# Patient Record
Sex: MALE | Race: WHITE | NOT HISPANIC OR LATINO | Employment: OTHER | URBAN - METROPOLITAN AREA
[De-identification: names, ages, dates, MRNs, and addresses within clinical notes are randomized per-mention and may not be internally consistent; named-entity substitution may affect disease eponyms.]

---

## 2022-03-04 PROBLEM — E78.5 HYPERLIPIDEMIA: Status: ACTIVE | Noted: 2020-07-02

## 2022-03-04 PROBLEM — E03.9 HYPOTHYROIDISM: Status: ACTIVE | Noted: 2020-07-02

## 2022-03-04 PROBLEM — I10 HYPERTENSION: Status: ACTIVE | Noted: 2021-05-18

## 2022-03-15 ENCOUNTER — TELEPHONE (OUTPATIENT)
Dept: ORTHOPEDICS | Facility: CLINIC | Age: 62
End: 2022-03-15
Payer: COMMERCIAL

## 2022-03-15 ENCOUNTER — TELEPHONE (OUTPATIENT)
Dept: ADMINISTRATIVE | Facility: OTHER | Age: 62
End: 2022-03-15
Payer: COMMERCIAL

## 2022-03-15 NOTE — TELEPHONE ENCOUNTER
----- Message from Adrian Schultz Jr., MD sent at 3/14/2022  3:48 PM CDT -----  Regarding: RE: ED follow up  Contact: [phone]  Next 1-2 weeks  ----- Message -----  From: Edelmira Holden MA  Sent: 3/14/2022   3:21 PM CDT  To: Adrian Schultz Jr., MD  Subject: FW: ED follow up                                 How soon would you like patient to be seen?  ----- Message -----  From: Idalia Sheikh  Sent: 3/14/2022   2:57 PM CDT  To: [facility] Staff  Subject: ED follow up                                     Patient is requesting a call back regarding scheduling an ED follow up with referral for   Closed nondisplaced fracture of shaft of fourth metacarpal bone of left hand.   Would the patient rather a call back or a response via [facility]?  call  Best Call Back Number:  [phone]  Additional Information:

## 2022-03-16 ENCOUNTER — OFFICE VISIT (OUTPATIENT)
Dept: ORTHOPEDICS | Facility: CLINIC | Age: 62
End: 2022-03-16
Payer: COMMERCIAL

## 2022-03-16 VITALS — BODY MASS INDEX: 28.79 KG/M2 | WEIGHT: 190 LBS | HEIGHT: 68 IN

## 2022-03-16 DIAGNOSIS — S62.355A CLOSED NONDISPLACED FRACTURE OF SHAFT OF FOURTH METACARPAL BONE OF LEFT HAND, INITIAL ENCOUNTER: ICD-10-CM

## 2022-03-16 PROCEDURE — 99999 PR PBB SHADOW E&M-EST. PATIENT-LVL III: ICD-10-PCS | Mod: PBBFAC,,, | Performed by: ORTHOPAEDIC SURGERY

## 2022-03-16 PROCEDURE — 99203 PR OFFICE/OUTPT VISIT, NEW, LEVL III, 30-44 MIN: ICD-10-PCS | Mod: S$GLB,,, | Performed by: ORTHOPAEDIC SURGERY

## 2022-03-16 PROCEDURE — 99203 OFFICE O/P NEW LOW 30 MIN: CPT | Mod: S$GLB,,, | Performed by: ORTHOPAEDIC SURGERY

## 2022-03-16 PROCEDURE — 99999 PR PBB SHADOW E&M-EST. PATIENT-LVL III: CPT | Mod: PBBFAC,,, | Performed by: ORTHOPAEDIC SURGERY

## 2022-03-16 RX ORDER — HYDROCODONE BITARTRATE AND ACETAMINOPHEN 5; 325 MG/1; MG/1
1 TABLET ORAL EVERY 4 HOURS PRN
Qty: 30 TABLET | Refills: 0 | Status: SHIPPED | OUTPATIENT
Start: 2022-03-16 | End: 2022-03-26

## 2022-03-16 NOTE — PROGRESS NOTES
"Subjective:      Patient ID: Rashaad Amos is a 61 y.o. male.    Chief Complaint: Consult and Hand Injury (Left hand fracture.)      HPI  Rashaad Amos is a  61 y.o. male presenting today for left hand injury.  There was a history of trauma.  Onset of symptoms began 4 days ago after a fall he sustained landing on his left hand  Seen at urgent care noted to have a 4th metacarpal fracture placed in a splint referred for treatment today  Complaining of pain left hand.      Review of patient's allergies indicates:  No Known Allergies      Current Outpatient Medications   Medication Sig Dispense Refill    AJOVY AUTOINJECTOR 225 mg/1.5 mL autoinjector Inject 225 mg into the skin every 30 days.      atorvastatin (LIPITOR) 10 MG tablet Take 10 mg by mouth once daily.      HYDROcodone-acetaminophen (NORCO) 7.5-325 mg per tablet Take 1 tablet by mouth every 4 (four) hours as needed for Pain. 18 tablet 0    ibuprofen (ADVIL,MOTRIN) 800 MG tablet Take 1 tablet (800 mg total) by mouth every 6 (six) hours as needed for Pain. 30 tablet 0    levothyroxine (SYNTHROID) 50 MCG tablet Take 50 mcg by mouth once daily.      losartan (COZAAR) 100 MG tablet Take 50 mg by mouth 2 (two) times a day.      testosterone (ANDROGEL) 20.25 mg/1.25 gram (1.62 %) GlPm 20.25 mg 4 (four) times daily.      UBROGEPANT 50 mg tablet Take 50 mg by mouth as needed.       No current facility-administered medications for this visit.       Past Medical History:   Diagnosis Date    Hypertension        No past surgical history on file.    Review of Systems:  ROS    OBJECTIVE:     PHYSICAL EXAM:  Height: 5' 8" (172.7 cm) Weight: 86.2 kg (190 lb)  Vitals:    03/16/22 1353   Weight: 86.2 kg (190 lb)   Height: 5' 8" (1.727 m)   PainSc: 10-Worst pain ever   PainLoc: Hand     Well developed, well nourished male in no acute distress  Alert and oriented x 3  HEENT- Normal exam  Lungs- Clear to auscultation  Heart- Regular rate and rhythm  Abdomen- " Soft nontender  Extremity exam- examination left hand there is some mild swelling slight bruising diffuse tenderness over the 4th metacarpal  Range of motion fingers limited  Sensation intact  No rotational overlap of the digits    RADIOGRAPHS:  AP lateral x-ray left hand demonstrates a minimally displaced 4th metacarpal shaft fracture acceptable position  Comments: I have personally reviewed the imaging and I agree with the above radiologist's report.    ASSESSMENT/PLAN:     IMPRESSION:  Minimally displaced 4th metacarpal shaft fracture left hand    PLAN:  Explained the nature of the injury to the patient  Fitted him for an ulnar gutter splint part-time use  He can have it off for watching TV and bathing  No heavy lifting  Ultram for pain  Follow-up 3 weeks       - We talked at length about the anatomy and pathophysiology of   Encounter Diagnosis   Name Primary?    Closed nondisplaced fracture of shaft of fourth metacarpal bone of left hand, initial encounter            Disclaimer: This note has been generated using voice-recognition software. There may be typographical errors that have been missed during proof-reading.

## 2022-03-22 RX ORDER — HYDROCODONE BITARTRATE AND ACETAMINOPHEN 5; 325 MG/1; MG/1
1 TABLET ORAL EVERY 6 HOURS PRN
Qty: 30 TABLET | Refills: 0 | Status: SHIPPED | OUTPATIENT
Start: 2022-03-22 | End: 2022-04-01

## 2022-03-29 ENCOUNTER — TELEPHONE (OUTPATIENT)
Dept: ORTHOPEDICS | Facility: CLINIC | Age: 62
End: 2022-03-29
Payer: COMMERCIAL

## 2022-03-29 RX ORDER — HYDROCODONE BITARTRATE AND ACETAMINOPHEN 5; 325 MG/1; MG/1
1 TABLET ORAL EVERY 12 HOURS PRN
Qty: 30 TABLET | Refills: 0 | Status: SHIPPED | OUTPATIENT
Start: 2022-03-29 | End: 2022-04-08

## 2022-03-29 NOTE — TELEPHONE ENCOUNTER
----- Message from Solange Mendez sent at 3/29/2022  9:00 AM CDT -----  Regarding: Refills  Contact: [phone]  Type:  RX Refill Request    Who Called: PT   Refill or New Rx: Refills   RX Name and Strength: HYDROcodone-acetaminophen (NORCO) 5-325 mg per tablet 30 tablet   How is the patient currently taking it? (ex. 1XDay): Take 1 tablet by mouth every 6 (six) hours as needed for Pain  Is this a 30 day or 90 day RX: 30  Preferred Pharmacy with phone number: [id number] - [place] - [address]   Phone:  [phone]  Fax:  [phone]

## 2022-04-01 DIAGNOSIS — S62.355A CLOSED NONDISPLACED FRACTURE OF SHAFT OF FOURTH METACARPAL BONE OF LEFT HAND, INITIAL ENCOUNTER: Primary | ICD-10-CM

## 2022-04-06 ENCOUNTER — OFFICE VISIT (OUTPATIENT)
Dept: ORTHOPEDICS | Facility: CLINIC | Age: 62
End: 2022-04-06
Payer: COMMERCIAL

## 2022-04-06 VITALS — WEIGHT: 190.06 LBS | BODY MASS INDEX: 28.8 KG/M2 | HEIGHT: 68 IN

## 2022-04-06 DIAGNOSIS — S62.355D CLOSED NONDISPLACED FRACTURE OF SHAFT OF FOURTH METACARPAL BONE OF LEFT HAND WITH ROUTINE HEALING, SUBSEQUENT ENCOUNTER: Primary | ICD-10-CM

## 2022-04-06 PROCEDURE — 99213 OFFICE O/P EST LOW 20 MIN: CPT | Mod: S$GLB,,, | Performed by: ORTHOPAEDIC SURGERY

## 2022-04-06 PROCEDURE — 99999 PR PBB SHADOW E&M-EST. PATIENT-LVL III: ICD-10-PCS | Mod: PBBFAC,,, | Performed by: ORTHOPAEDIC SURGERY

## 2022-04-06 PROCEDURE — 99213 PR OFFICE/OUTPT VISIT, EST, LEVL III, 20-29 MIN: ICD-10-PCS | Mod: S$GLB,,, | Performed by: ORTHOPAEDIC SURGERY

## 2022-04-06 PROCEDURE — 99999 PR PBB SHADOW E&M-EST. PATIENT-LVL III: CPT | Mod: PBBFAC,,, | Performed by: ORTHOPAEDIC SURGERY

## 2022-04-06 RX ORDER — IBUPROFEN 800 MG/1
800 TABLET ORAL 2 TIMES DAILY WITH MEALS
Qty: 40 TABLET | Refills: 1 | Status: SHIPPED | OUTPATIENT
Start: 2022-04-06 | End: 2022-05-06

## 2022-04-06 NOTE — PROGRESS NOTES
"Subjective:      Patient ID: Rashaad Amos is a 61 y.o. male.  Chief Complaint: Follow-up (left hand fx)      HPI  Rashaad Amos is a  61 y.o. male presenting today for follow up of left 4th metacarpal shaft fracture now 3 weeks out from injury.  He reports that he is doing well having some pain and stiffness but improving  .    Review of patient's allergies indicates:  No Known Allergies      Current Outpatient Medications   Medication Sig Dispense Refill    AJOVY AUTOINJECTOR 225 mg/1.5 mL autoinjector Inject 225 mg into the skin every 30 days.      atorvastatin (LIPITOR) 10 MG tablet Take 10 mg by mouth once daily.      HYDROcodone-acetaminophen (NORCO) 5-325 mg per tablet Take 1 tablet by mouth every 12 (twelve) hours as needed for Pain. 30 tablet 0    levothyroxine (SYNTHROID) 50 MCG tablet Take 50 mcg by mouth once daily.      losartan (COZAAR) 100 MG tablet Take 50 mg by mouth 2 (two) times a day.      HYDROcodone-acetaminophen (NORCO) 7.5-325 mg per tablet Take 1 tablet by mouth every 4 (four) hours as needed for Pain. (Patient not taking: Reported on 4/6/2022) 18 tablet 0    ibuprofen (ADVIL,MOTRIN) 800 MG tablet Take 1 tablet (800 mg total) by mouth every 6 (six) hours as needed for Pain. (Patient not taking: Reported on 4/6/2022) 30 tablet 0    ibuprofen (ADVIL,MOTRIN) 800 MG tablet Take 1 tablet (800 mg total) by mouth 2 (two) times daily with meals. 40 tablet 1    testosterone (ANDROGEL) 20.25 mg/1.25 gram (1.62 %) GlPm 20.25 mg 4 (four) times daily.      UBROGEPANT 50 mg tablet Take 50 mg by mouth as needed.       No current facility-administered medications for this visit.       Past Medical History:   Diagnosis Date    Hypertension        No past surgical history on file.    OBJECTIVE:   PHYSICAL EXAM:  Height: 5' 8" (172.7 cm) Weight: 86.2 kg (190 lb 0.6 oz)  Vitals:    04/06/22 1259   Weight: 86.2 kg (190 lb 0.6 oz)   Height: 5' 8" (1.727 m)   PainSc:   5   PainLoc: Hand "     Ortho/SPM Exam  Examination left hand fracture site still slightly tender swelling minimal range of motion slightly decreased  Alignment looks good clinically no overlap sensation intact      RADIOGRAPHS:  AP lateral x-ray left hand demonstrates healing fracture 4th metacarpal minimally displaced  Comments: I have personally reviewed the imaging and I agree with the above radiologist's report.    ASSESSMENT/PLAN:     IMPRESSION:  4th metacarpal shaft fracture left hand    PLAN:  He can start weaning out of the splint now specially at home  I recommended some warm water soaks gentle range of motion Motrin as needed for pain  No heavy lifting      FOLLOW UP:  3 weeks    Disclaimer: This note has been generated using voice-recognition software. There may be typographical errors that have been missed during proof-reading.

## 2022-05-03 ENCOUNTER — PATIENT MESSAGE (OUTPATIENT)
Dept: ORTHOPEDICS | Facility: CLINIC | Age: 62
End: 2022-05-03
Payer: COMMERCIAL

## 2022-05-04 ENCOUNTER — OFFICE VISIT (OUTPATIENT)
Dept: ORTHOPEDICS | Facility: CLINIC | Age: 62
End: 2022-05-04
Payer: COMMERCIAL

## 2022-05-04 ENCOUNTER — TELEPHONE (OUTPATIENT)
Dept: ORTHOPEDICS | Facility: CLINIC | Age: 62
End: 2022-05-04
Payer: COMMERCIAL

## 2022-05-04 VITALS — WEIGHT: 190 LBS | HEIGHT: 68 IN | BODY MASS INDEX: 28.79 KG/M2

## 2022-05-04 DIAGNOSIS — S62.355A CLOSED NONDISPLACED FRACTURE OF SHAFT OF FOURTH METACARPAL BONE OF LEFT HAND, INITIAL ENCOUNTER: Primary | ICD-10-CM

## 2022-05-04 DIAGNOSIS — S62.355D CLOSED NONDISPLACED FRACTURE OF SHAFT OF FOURTH METACARPAL BONE OF LEFT HAND WITH ROUTINE HEALING, SUBSEQUENT ENCOUNTER: Primary | ICD-10-CM

## 2022-05-04 PROCEDURE — 99999 PR PBB SHADOW E&M-EST. PATIENT-LVL III: ICD-10-PCS | Mod: PBBFAC,,, | Performed by: ORTHOPAEDIC SURGERY

## 2022-05-04 PROCEDURE — 99999 PR PBB SHADOW E&M-EST. PATIENT-LVL III: CPT | Mod: PBBFAC,,, | Performed by: ORTHOPAEDIC SURGERY

## 2022-05-04 PROCEDURE — 99213 PR OFFICE/OUTPT VISIT, EST, LEVL III, 20-29 MIN: ICD-10-PCS | Mod: S$GLB,,, | Performed by: ORTHOPAEDIC SURGERY

## 2022-05-04 PROCEDURE — 99213 OFFICE O/P EST LOW 20 MIN: CPT | Mod: S$GLB,,, | Performed by: ORTHOPAEDIC SURGERY

## 2022-05-04 RX ORDER — HYDROCHLOROTHIAZIDE 12.5 MG/1
12.5 CAPSULE ORAL DAILY
COMMUNITY
Start: 2022-05-03 | End: 2022-05-16

## 2022-05-04 RX ORDER — OMEGA-3 FATTY ACIDS 1000 MG
2 CAPSULE ORAL
COMMUNITY

## 2022-05-04 NOTE — PROGRESS NOTES
"Subjective:      Patient ID: Rashaad Amos is a 61 y.o. male.  Chief Complaint: Fracture and Follow-up of the Left Hand      HPI  Rashaad Amos is a  61 y.o. male presenting today for follow up of 4th metacarpal shaft fracture.  He reports that he is now about 5 and half weeks out from injury  Pain is minimal but he is developing some stiffness in the fingers of the left hand difficulty with gripping  He does have the brace which he has used.    Review of patient's allergies indicates:  No Known Allergies      Current Outpatient Medications   Medication Sig Dispense Refill    AJOVY AUTOINJECTOR 225 mg/1.5 mL autoinjector Inject 225 mg into the skin every 30 days.      atorvastatin (LIPITOR) 10 MG tablet Take 10 mg by mouth once daily.      hydroCHLOROthiazide (MICROZIDE) 12.5 mg capsule Take 12.5 mg by mouth once daily.      ibuprofen (ADVIL,MOTRIN) 800 MG tablet Take 1 tablet (800 mg total) by mouth 2 (two) times daily with meals. 40 tablet 1    levothyroxine (SYNTHROID) 50 MCG tablet Take 50 mcg by mouth once daily.      losartan (COZAAR) 100 MG tablet Take 50 mg by mouth 2 (two) times a day.      omega-3 fatty acids 1,000 mg Cap Take 2 g by mouth.      testosterone (ANDROGEL) 20.25 mg/1.25 gram (1.62 %) GlPm 20.25 mg 4 (four) times daily.       No current facility-administered medications for this visit.       Past Medical History:   Diagnosis Date    Hypertension        No past surgical history on file.    OBJECTIVE:   PHYSICAL EXAM:  Height: 5' 8" (172.7 cm) Weight: 86.2 kg (190 lb)  Vitals:    05/04/22 1305   Weight: 86.2 kg (190 lb)   Height: 5' 8" (1.727 m)   PainSc:   3     Ortho/SPM Exam  Examination left hand there is some mild swelling and definitely stiffness of the fingers he has a difficult time making a fist and  strength is decreased  There is limited flexion of the ring and small finger  Sensation intact  Fracture site not really tender    RADIOGRAPHS:  AP lateral x-ray left " hand demonstrates healing fracture at the base of the 4th metacarpal some callus is noted  Comments: I have personally reviewed the imaging and I agree with the above radiologist's report.    ASSESSMENT/PLAN:     IMPRESSION:  Left 4th metacarpal shaft fracture    PLAN:  I am little bit concerned about the stiffness in the fingers I recommended some OT will start range of motion and progress to gentle strengthening  I have also recommended that he discontinue the splint at home work on some warm water soaks and range of motion  No heavy lifting yet    FOLLOW UP:  3-4 weeks    Disclaimer: This note has been generated using voice-recognition software. There may be typographical errors that have been missed during proof-reading.

## 2022-05-09 PROBLEM — Z78.9 IMPAIRED INSTRUMENTAL ACTIVITIES OF DAILY LIVING (IADL): Status: ACTIVE | Noted: 2022-05-09

## 2022-05-09 PROBLEM — M79.642 LEFT HAND PAIN: Status: ACTIVE | Noted: 2022-05-09

## 2022-05-09 PROBLEM — M25.642 STIFFNESS OF LEFT HAND JOINT: Status: ACTIVE | Noted: 2022-05-09

## 2022-05-09 PROBLEM — R53.1 WEAKNESS: Status: ACTIVE | Noted: 2022-05-09

## 2022-05-31 ENCOUNTER — TELEPHONE (OUTPATIENT)
Dept: ORTHOPEDICS | Facility: CLINIC | Age: 62
End: 2022-05-31
Payer: COMMERCIAL

## 2022-05-31 DIAGNOSIS — S62.355A CLOSED NONDISPLACED FRACTURE OF SHAFT OF FOURTH METACARPAL BONE OF LEFT HAND, INITIAL ENCOUNTER: Primary | ICD-10-CM

## 2022-06-01 ENCOUNTER — OFFICE VISIT (OUTPATIENT)
Dept: ORTHOPEDICS | Facility: CLINIC | Age: 62
End: 2022-06-01
Payer: COMMERCIAL

## 2022-06-01 VITALS — BODY MASS INDEX: 29.57 KG/M2 | WEIGHT: 195.13 LBS | HEIGHT: 68 IN

## 2022-06-01 DIAGNOSIS — S62.355D CLOSED NONDISPLACED FRACTURE OF SHAFT OF FOURTH METACARPAL BONE OF LEFT HAND WITH ROUTINE HEALING, SUBSEQUENT ENCOUNTER: Primary | ICD-10-CM

## 2022-06-01 PROCEDURE — 99213 PR OFFICE/OUTPT VISIT, EST, LEVL III, 20-29 MIN: ICD-10-PCS | Mod: S$GLB,,, | Performed by: ORTHOPAEDIC SURGERY

## 2022-06-01 PROCEDURE — 99213 OFFICE O/P EST LOW 20 MIN: CPT | Mod: S$GLB,,, | Performed by: ORTHOPAEDIC SURGERY

## 2022-06-01 PROCEDURE — 99999 PR PBB SHADOW E&M-EST. PATIENT-LVL III: ICD-10-PCS | Mod: PBBFAC,,, | Performed by: ORTHOPAEDIC SURGERY

## 2022-06-01 PROCEDURE — 99999 PR PBB SHADOW E&M-EST. PATIENT-LVL III: CPT | Mod: PBBFAC,,, | Performed by: ORTHOPAEDIC SURGERY

## 2022-06-01 NOTE — PROGRESS NOTES
"Subjective:      Patient ID: Rashaad Amos is a 62 y.o. male.  Chief Complaint: Follow-up (3 wk f/up- left hand fx )      HPI  Rashaad Amos is a  62 y.o. male presenting today for follow up of left 4th metacarpal shaft fracture.  He reports that he is still having some residual stiffness no real pain reported  He is currently in therapy which is helping.    Review of patient's allergies indicates:  No Known Allergies      Current Outpatient Medications   Medication Sig Dispense Refill    atorvastatin (LIPITOR) 10 MG tablet Take 10 mg by mouth once daily.      hydroCHLOROthiazide (HYDRODIURIL) 25 MG tablet Take 1 tablet (25 mg total) by mouth once daily. 30 tablet 11    levothyroxine (SYNTHROID) 50 MCG tablet Take 50 mcg by mouth once daily.      losartan (COZAAR) 100 MG tablet Take 50 mg by mouth 2 (two) times a day.      omega-3 fatty acids 1,000 mg Cap Take 2 g by mouth.      testosterone (ANDROGEL) 20.25 mg/1.25 gram (1.62 %) GlPm 20.25 mg 4 (four) times daily.      AJOVY AUTOINJECTOR 225 mg/1.5 mL autoinjector Inject 225 mg into the skin every 30 days.       No current facility-administered medications for this visit.       Past Medical History:   Diagnosis Date    Hypertension        No past surgical history on file.    OBJECTIVE:   PHYSICAL EXAM:  Height: 5' 8" (172.7 cm) Weight: 88.5 kg (195 lb 1.7 oz)  Vitals:    06/01/22 1327   Weight: 88.5 kg (195 lb 1.7 oz)   Height: 5' 8" (1.727 m)   PainSc: 0-No pain     Ortho/SPM Exam  Examination left hand still has little bit of mild swelling in the hand range of motion fingers improved but  strength still decreased  No tenderness at the fracture site  Sensation intact all digits    RADIOGRAPHS:  AP lateral x-ray left hand demonstrate almost completely healed fracture 4th metacarpal shaft well-aligned  Comments: I have personally reviewed the imaging and I agree with the above radiologist's report.    ASSESSMENT/PLAN:     IMPRESSION:  Status " post left 4th metacarpal shaft fracture with stiffness    PLAN:  I have given him a squeeze ball to work on range of motion strengthening  Continue therapy for few more weeks advance activities as tolerated    FOLLOW UP:  3-4 weeks    Disclaimer: This note has been generated using voice-recognition software. There may be typographical errors that have been missed during proof-reading.

## 2022-06-29 ENCOUNTER — TELEPHONE (OUTPATIENT)
Dept: ORTHOPEDICS | Facility: CLINIC | Age: 62
End: 2022-06-29
Payer: COMMERCIAL

## 2022-06-29 DIAGNOSIS — S62.355A CLOSED NONDISPLACED FRACTURE OF SHAFT OF FOURTH METACARPAL BONE OF LEFT HAND, INITIAL ENCOUNTER: Primary | ICD-10-CM

## 2022-07-06 ENCOUNTER — OFFICE VISIT (OUTPATIENT)
Dept: ORTHOPEDICS | Facility: CLINIC | Age: 62
End: 2022-07-06
Payer: COMMERCIAL

## 2022-07-06 VITALS — HEIGHT: 68 IN | WEIGHT: 195 LBS | BODY MASS INDEX: 29.55 KG/M2

## 2022-07-06 DIAGNOSIS — S62.355D CLOSED NONDISPLACED FRACTURE OF SHAFT OF FOURTH METACARPAL BONE OF LEFT HAND WITH ROUTINE HEALING, SUBSEQUENT ENCOUNTER: Primary | ICD-10-CM

## 2022-07-06 PROCEDURE — 99213 PR OFFICE/OUTPT VISIT, EST, LEVL III, 20-29 MIN: ICD-10-PCS | Mod: S$GLB,,, | Performed by: ORTHOPAEDIC SURGERY

## 2022-07-06 PROCEDURE — 99999 PR PBB SHADOW E&M-EST. PATIENT-LVL III: CPT | Mod: PBBFAC,,, | Performed by: ORTHOPAEDIC SURGERY

## 2022-07-06 PROCEDURE — 99213 OFFICE O/P EST LOW 20 MIN: CPT | Mod: S$GLB,,, | Performed by: ORTHOPAEDIC SURGERY

## 2022-07-06 PROCEDURE — 99999 PR PBB SHADOW E&M-EST. PATIENT-LVL III: ICD-10-PCS | Mod: PBBFAC,,, | Performed by: ORTHOPAEDIC SURGERY

## 2022-07-06 RX ORDER — SPIRONOLACTONE AND HYDROCHLOROTHIAZIDE 25; 25 MG/1; MG/1
1 TABLET ORAL
COMMUNITY
Start: 2022-06-14

## 2022-07-06 NOTE — PROGRESS NOTES
"Subjective:      Patient ID: Rashaad Amos is a 62 y.o. male.  Chief Complaint: Follow-up (Left hand fracture..c/o stiffness and shooting pain in fingers)      HPI  Rashaad Amos is a  62 y.o. male presenting today for follow up of left hand fracture.  He reports that he is doing well he has completed physical therapy no pain in the hand reported just some stiffness.    Review of patient's allergies indicates:  No Known Allergies      Current Outpatient Medications   Medication Sig Dispense Refill    AJOVY AUTOINJECTOR 225 mg/1.5 mL autoinjector Inject 225 mg into the skin every 30 days.      ASCORBIC ACID, VITAMIN C, ORAL Take by mouth.      atorvastatin (LIPITOR) 10 MG tablet Take 10 mg by mouth once daily.      levothyroxine (SYNTHROID) 50 MCG tablet Take 50 mcg by mouth once daily.      losartan (COZAAR) 100 MG tablet Take 50 mg by mouth 2 (two) times a day.      omega-3 fatty acids 1,000 mg Cap Take 2 g by mouth.      spironolactone-hydrochlorothiazide 25-25mg (ALDACTAZIDE) 25-25 mg Tab Take 1 tablet by mouth.      testosterone (ANDROGEL) 20.25 mg/1.25 gram (1.62 %) GlPm 20.25 mg 4 (four) times daily.       No current facility-administered medications for this visit.       Past Medical History:   Diagnosis Date    Hypertension        No past surgical history on file.    OBJECTIVE:   PHYSICAL EXAM:  Height: 5' 8" (172.7 cm) Weight: 88.5 kg (195 lb)  Vitals:    07/06/22 1438   Weight: 88.5 kg (195 lb)   Height: 5' 8" (1.727 m)   PainSc: 0-No pain     Ortho/SPM Exam  Examination left hand maybe some slight swelling  No tenderness at the fracture site over the 4th metacarpal  Range of motion fingers is slightly decreased mainly the MP joints of the index and middle finger where he has flexion to about 90°  strength is slightly decreased Tinel sign negative    RADIOGRAPHS:  AP lateral x-ray left hand demonstrate healed fracture 4th metacarpal shaft  Comments: I have personally reviewed the " imaging and I agree with the above radiologist's report.    ASSESSMENT/PLAN:     IMPRESSION:  Stiffness left hand after healed fracture    PLAN:  I recommended that he continue to work on range of motion and strengthening of the left hand  Advance to full activities  Also recommended Voltaren gel topical      FOLLOW UP:  6-8 weeks    Disclaimer: This note has been generated using voice-recognition software. There may be typographical errors that have been missed during proof-reading.

## 2022-08-30 ENCOUNTER — PATIENT MESSAGE (OUTPATIENT)
Dept: ORTHOPEDICS | Facility: CLINIC | Age: 62
End: 2022-08-30
Payer: COMMERCIAL

## 2022-08-31 ENCOUNTER — OFFICE VISIT (OUTPATIENT)
Dept: ORTHOPEDICS | Facility: CLINIC | Age: 62
End: 2022-08-31
Payer: COMMERCIAL

## 2022-08-31 VITALS — WEIGHT: 195 LBS | BODY MASS INDEX: 29.55 KG/M2 | HEIGHT: 68 IN

## 2022-08-31 DIAGNOSIS — S62.355D CLOSED NONDISPLACED FRACTURE OF SHAFT OF FOURTH METACARPAL BONE OF LEFT HAND WITH ROUTINE HEALING, SUBSEQUENT ENCOUNTER: Primary | ICD-10-CM

## 2022-08-31 DIAGNOSIS — M25.642 STIFFNESS OF LEFT HAND JOINT: ICD-10-CM

## 2022-08-31 PROBLEM — S62.355A CLOSED NONDISPLACED FRACTURE OF SHAFT OF FOURTH METACARPAL BONE OF LEFT HAND: Status: RESOLVED | Noted: 2022-03-16 | Resolved: 2022-08-31

## 2022-08-31 PROCEDURE — 99999 PR PBB SHADOW E&M-EST. PATIENT-LVL III: ICD-10-PCS | Mod: PBBFAC,,, | Performed by: ORTHOPAEDIC SURGERY

## 2022-08-31 PROCEDURE — 99213 PR OFFICE/OUTPT VISIT, EST, LEVL III, 20-29 MIN: ICD-10-PCS | Mod: S$GLB,,, | Performed by: ORTHOPAEDIC SURGERY

## 2022-08-31 PROCEDURE — 99213 OFFICE O/P EST LOW 20 MIN: CPT | Mod: S$GLB,,, | Performed by: ORTHOPAEDIC SURGERY

## 2022-08-31 PROCEDURE — 99999 PR PBB SHADOW E&M-EST. PATIENT-LVL III: CPT | Mod: PBBFAC,,, | Performed by: ORTHOPAEDIC SURGERY

## 2022-08-31 NOTE — PROGRESS NOTES
"Subjective:      Patient ID: Rashaad Amos is a 62 y.o. male.  Chief Complaint: Follow-up (Left hand fracture, slight stiffness and pain)      HPI  Rashaad Amos is a  62 y.o. male presenting today for follow up of stiffness left hand after previous fracture of the 4th metacarpal.  He reports that he is improving slowly but still having some stiffness mainly in the fingers of the left hand  He does do exercises warm water soaks and has a squeeze ball   The Voltaren is helping.    Review of patient's allergies indicates:  No Known Allergies      Current Outpatient Medications   Medication Sig Dispense Refill    AJOVY AUTOINJECTOR 225 mg/1.5 mL autoinjector Inject 225 mg into the skin every 30 days.      ASCORBIC ACID, VITAMIN C, ORAL Take by mouth.      atorvastatin (LIPITOR) 10 MG tablet Take 10 mg by mouth once daily.      levothyroxine (SYNTHROID) 50 MCG tablet Take 50 mcg by mouth once daily.      losartan (COZAAR) 100 MG tablet Take 50 mg by mouth 2 (two) times a day.      omega-3 fatty acids 1,000 mg Cap Take 2 g by mouth.      spironolactone-hydrochlorothiazide 25-25mg (ALDACTAZIDE) 25-25 mg Tab Take 1 tablet by mouth.      testosterone (ANDROGEL) 20.25 mg/1.25 gram (1.62 %) GlPm 20.25 mg 4 (four) times daily.       No current facility-administered medications for this visit.       Past Medical History:   Diagnosis Date    Hypertension        No past surgical history on file.    OBJECTIVE:   PHYSICAL EXAM:  Height: 5' 8" (172.7 cm) Weight: 88.5 kg (195 lb)  Vitals:    08/31/22 1413   Weight: 88.5 kg (195 lb)   Height: 5' 8" (1.727 m)   PainSc:   2   PainLoc: Hand     Ortho/SPM Exam tenderness and swelling of the PIP joints is noted range of motion almost  Maybe lacks flexion by a 10-20 degrees   Strength improved   Sensation intact all digits   Tinel sign negative     Examination left hand some mild    RADIOGRAPHS:  Previous x-rays showed healed fracture 4th metacarpal  Comments: I have personally " reviewed the imaging and I agree with the above radiologist's report.    ASSESSMENT/PLAN:     IMPRESSION:  Stiffness left hand after metacarpal fracture    PLAN:  I think he is doing well and progressing well  Continue squeeze ball continue Voltaren gel otherwise full activities    FOLLOW UP:  2 months or as needed    Disclaimer: This note has been generated using voice-recognition software. There may be typographical errors that have been missed during proof-reading.

## 2023-09-29 ENCOUNTER — OFFICE VISIT (OUTPATIENT)
Dept: URGENT CARE | Facility: CLINIC | Age: 63
End: 2023-09-29
Payer: COMMERCIAL

## 2023-09-29 VITALS
WEIGHT: 192 LBS | HEIGHT: 68 IN | OXYGEN SATURATION: 98 % | RESPIRATION RATE: 16 BRPM | HEART RATE: 51 BPM | DIASTOLIC BLOOD PRESSURE: 76 MMHG | TEMPERATURE: 98 F | SYSTOLIC BLOOD PRESSURE: 134 MMHG | BODY MASS INDEX: 29.1 KG/M2

## 2023-09-29 DIAGNOSIS — E07.9 THYROID DISORDER: ICD-10-CM

## 2023-09-29 DIAGNOSIS — E78.5 HYPERLIPIDEMIA, UNSPECIFIED HYPERLIPIDEMIA TYPE: ICD-10-CM

## 2023-09-29 DIAGNOSIS — T14.8XXA ABRASION: ICD-10-CM

## 2023-09-29 DIAGNOSIS — S00.93XA CONTUSION OF HEAD, UNSPECIFIED PART OF HEAD, INITIAL ENCOUNTER: ICD-10-CM

## 2023-09-29 DIAGNOSIS — S20.219A CONTUSION OF CHEST WALL, UNSPECIFIED LATERALITY, INITIAL ENCOUNTER: ICD-10-CM

## 2023-09-29 DIAGNOSIS — S16.1XXA STRAIN OF NECK MUSCLE, INITIAL ENCOUNTER: ICD-10-CM

## 2023-09-29 DIAGNOSIS — R51.9 NONINTRACTABLE HEADACHE, UNSPECIFIED CHRONICITY PATTERN, UNSPECIFIED HEADACHE TYPE: ICD-10-CM

## 2023-09-29 DIAGNOSIS — I10 HYPERTENSION, UNSPECIFIED TYPE: ICD-10-CM
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
## 2023-09-29 PROCEDURE — 93005 ELECTROCARDIOGRAM TRACING: CPT | Mod: S$GLB,,, | Performed by: PHYSICIAN ASSISTANT

## 2023-09-29 PROCEDURE — 96372 PR INJECTION,THERAP/PROPH/DIAG2ST, IM OR SUBCUT: ICD-10-PCS | Mod: S$GLB,,, | Performed by: PHYSICIAN ASSISTANT

## 2023-09-29 PROCEDURE — 93010 EKG 12-LEAD: ICD-10-PCS | Mod: S$GLB,,, | Performed by: INTERNAL MEDICINE

## 2023-09-29 PROCEDURE — 96372 THER/PROPH/DIAG INJ SC/IM: CPT | Mod: S$GLB,,, | Performed by: PHYSICIAN ASSISTANT

## 2023-09-29 PROCEDURE — 93010 ELECTROCARDIOGRAM REPORT: CPT | Mod: S$GLB,,, | Performed by: INTERNAL MEDICINE

## 2023-09-29 PROCEDURE — 93005 EKG 12-LEAD: ICD-10-PCS | Mod: S$GLB,,, | Performed by: PHYSICIAN ASSISTANT

## 2023-09-29 PROCEDURE — 99204 PR OFFICE/OUTPT VISIT, NEW, LEVL IV, 45-59 MIN: ICD-10-PCS | Mod: 25,S$GLB,, | Performed by: PHYSICIAN ASSISTANT

## 2023-09-29 PROCEDURE — 99204 OFFICE O/P NEW MOD 45 MIN: CPT | Mod: 25,S$GLB,, | Performed by: PHYSICIAN ASSISTANT

## 2023-09-29 RX ORDER — KETOROLAC TROMETHAMINE 30 MG/ML
30 INJECTION, SOLUTION INTRAMUSCULAR; INTRAVENOUS
Status: COMPLETED | OUTPATIENT
Start: 2023-09-29 | End: 2023-09-29

## 2023-09-29 RX ORDER — MUPIROCIN 20 MG/G
OINTMENT TOPICAL 3 TIMES DAILY
Qty: 1 G | Refills: 0 | Status: SHIPPED | OUTPATIENT
Start: 2023-09-29

## 2023-09-29 RX ORDER — TADALAFIL 20 MG/1
20 TABLET ORAL DAILY PRN
COMMUNITY
Start: 2023-07-17

## 2023-09-29 RX ADMIN — KETOROLAC TROMETHAMINE 30 MG: 30 INJECTION, SOLUTION INTRAMUSCULAR; INTRAVENOUS at 05:09
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
## 2023-09-29 NOTE — PATIENT INSTRUCTIONS
If you develop vomiting, dizziness, changes in vision, slurred speech, facial droop or have any other concerns, you should present to the nearest emergency department immediately.    If not allergic,take tylenol (acetominophen) for fever control, chills, or body aches every 4 hours. Do not exceed 4000 mg/ day.If not allergic, take Motrin (Ibuprofen) every 4 hours for fever, chills, pain or inflammation. Do not exceed 2400 mg/day. You can alternate taking tylenol and motrin.     You must understand that you've received an Urgent Care treatment only and that you may be released before all your medical problems are known or treated. You, the patient, will arrange for follow up care as instructed.      Follow up with your PCP or specialty clinic as instructed in the next 2-3 days if not improved or as needed. You can call [phone] to schedule an appointment with appropriate provider.      If you condition worsens, we recommend that you receive another evaluation at the emergency room immediately or contact your primary medical clinic's after hours call service to discuss your concerns.      Please return here or go to the Emergency Department for any concerns or worsening condition.      If you were prescribed a narcotic or controlled substance, do not drive or operate heavy equipment or machinery while taking these medications.

## 2024-05-28 ENCOUNTER — OFFICE VISIT (OUTPATIENT)
Dept: FAMILY MEDICINE | Facility: CLINIC | Age: 64
End: 2024-05-28
Payer: COMMERCIAL

## 2024-05-28 VITALS
OXYGEN SATURATION: 96 % | WEIGHT: 185.06 LBS | SYSTOLIC BLOOD PRESSURE: 110 MMHG | HEART RATE: 65 BPM | DIASTOLIC BLOOD PRESSURE: 70 MMHG | BODY MASS INDEX: 28.05 KG/M2 | HEIGHT: 68 IN

## 2024-05-28 DIAGNOSIS — E29.1 MALE HYPOGONADISM: ICD-10-CM

## 2024-05-28 DIAGNOSIS — R73.03 PREDIABETES: Primary | ICD-10-CM

## 2024-05-28 DIAGNOSIS — E78.5 HYPERLIPIDEMIA, UNSPECIFIED HYPERLIPIDEMIA TYPE: ICD-10-CM

## 2024-05-28 DIAGNOSIS — R51.9 CHRONIC NONINTRACTABLE HEADACHE, UNSPECIFIED HEADACHE TYPE: ICD-10-CM

## 2024-05-28 DIAGNOSIS — E03.9 HYPOTHYROIDISM, UNSPECIFIED TYPE: ICD-10-CM

## 2024-05-28 DIAGNOSIS — I10 HYPERTENSION, UNSPECIFIED TYPE: ICD-10-CM

## 2024-05-28 DIAGNOSIS — G89.29 CHRONIC NONINTRACTABLE HEADACHE, UNSPECIFIED HEADACHE TYPE: ICD-10-CM

## 2024-05-28 PROCEDURE — 99204 OFFICE O/P NEW MOD 45 MIN: CPT | Mod: S$GLB,,, | Performed by: FAMILY MEDICINE

## 2024-05-28 PROCEDURE — 99999 PR PBB SHADOW E&M-EST. PATIENT-LVL III: CPT | Mod: PBBFAC,,, | Performed by: FAMILY MEDICINE

## 2024-05-28 RX ORDER — BUTALBITAL, ACETAMINOPHEN AND CAFFEINE 50; 325; 40 MG/1; MG/1; MG/1
1 TABLET ORAL EVERY 4 HOURS PRN
Qty: 30 TABLET | Refills: 0 | Status: SHIPPED | OUTPATIENT
Start: 2024-05-28 | End: 2024-06-27

## 2024-05-28 NOTE — PROGRESS NOTES
PATIENT VISIT FAMILY MEDICINE    CC:   Chief Complaint   Patient presents with    Establish Care    Hypertension    Hyperlipidemia    Headache     Still having h/a       HPI: Rashaad Amos  is a 64 y.o. male:    Patient is known to me.  Patient presents routine follow up on chronic conditions and to establish care    Answers submitted by the patient for this visit:  Review of Systems Questionnaire (Submitted on 5/24/2024)  activity change: No  unexpected weight change: No  neck pain: No  hearing loss: No  rhinorrhea: No  trouble swallowing: No  eye discharge: No  visual disturbance: No  chest tightness: No  wheezing: No  chest pain: No  palpitations: No  blood in stool: No  constipation: No  vomiting: No  diarrhea: No  polydipsia: No  polyuria: No  difficulty urinating: No  urgency: No  hematuria: No  joint swelling: No  arthralgias: No  headaches: Yes  weakness: No  confusion: No  dysphoric mood: No      Sees Cardiology for HTN. Sees Urology on testosterone gel.       PMHX:   Past Medical History:   Diagnosis Date    Hypertension        PSHX: History reviewed. No pertinent surgical history.    FAMHX:   Family History   Adopted: Yes   Family history unknown: Yes       SOCHX:   Social History     Socioeconomic History    Marital status:    Tobacco Use    Smoking status: Never     Passive exposure: Never    Smokeless tobacco: Never   Substance and Sexual Activity    Alcohol use: Yes     Comment: Occasional    Drug use: Never    Sexual activity: Not Currently     Social Determinants of Health     Financial Resource Strain: Low Risk  (5/24/2024)    Overall Financial Resource Strain (CARDIA)     Difficulty of Paying Living Expenses: Not very hard   Food Insecurity: No Food Insecurity (5/24/2024)    Hunger Vital Sign     Worried About Running Out of Food in the Last Year: Never true     Ran Out of Food in the Last Year: Never true   Transportation Needs: No Transportation Needs (9/26/2023)    Received from  "[facility]    PRAPARE - Transportation     Lack of Transportation (Medical): No     Lack of Transportation (Non-Medical): No   Physical Activity: Sufficiently Active (5/24/2024)    Exercise Vital Sign     Days of Exercise per Week: 6 days     Minutes of Exercise per Session: 30 min   Stress: Stress Concern Present (5/24/2024)    [employer] - Occupational Stress Questionnaire     Feeling of Stress : To some extent   Housing Stability: Unknown (5/24/2024)    Housing Stability Vital Sign     Unable to Pay for Housing in the Last Year: No       ALLERGIES: Review of patient's allergies indicates:  No Known Allergies    MEDS:   Current Outpatient Medications:     ASCORBIC ACID, VITAMIN C, ORAL, Take by mouth., Disp: , Rfl:     atorvastatin (LIPITOR) 10 MG tablet, Take 10 mg by mouth once daily., Disp: , Rfl:     levothyroxine (SYNTHROID) 50 MCG tablet, Take 50 mcg by mouth once daily., Disp: , Rfl:     losartan (COZAAR) 100 MG tablet, Take 50 mg by mouth 2 (two) times a day., Disp: , Rfl:     omega-3 fatty acids 1,000 mg Cap, Take 2 g by mouth., Disp: , Rfl:     spironolactone-hydrochlorothiazide 25-25mg (ALDACTAZIDE) 25-25 mg Tab, Take 1 tablet by mouth., Disp: , Rfl:     tadalafiL (CIALIS) 20 MG Tab, Take 20 mg by mouth daily as needed., Disp: , Rfl:     testosterone (ANDROGEL) 20.25 mg/1.25 gram (1.62 %) GlPm, 20.25 mg 4 (four) times daily., Disp: , Rfl:     butalbital-acetaminophen-caffeine -40 mg (FIORICET, ESGIC) -40 mg per tablet, Take 1 tablet by mouth every 4 (four) hours as needed for Pain., Disp: 30 tablet, Rfl: 0    OBJECTIVE:   Vitals:    05/28/24 0905   BP: 110/70   BP Location: Left arm   Patient Position: Sitting   BP Method: Large (Manual)   Pulse: 65   SpO2: 96%   Weight: 84 kg (185 lb 1.2 oz)   Height: 5' 8" (1.727 m)     Body mass index is 28.14 kg/m².      Physical Exam  Vitals and nursing note reviewed.   Constitutional:       Appearance: Normal " appearance.   HENT:      Head: Normocephalic.   Eyes:      General:         Right eye: No discharge.         Left eye: No discharge.      Extraocular Movements: Extraocular movements intact.   Cardiovascular:      Rate and Rhythm: Normal rate and regular rhythm.      Heart sounds: Normal heart sounds.   Pulmonary:      Effort: Pulmonary effort is normal.      Breath sounds: Normal breath sounds.   Abdominal:      General: Abdomen is flat. Bowel sounds are normal. There is no distension.      Palpations: Abdomen is soft. There is no mass.      Tenderness: There is no abdominal tenderness. There is no guarding or rebound.      Hernia: No hernia is present.   Skin:     Comments: No obvious rash on exposed skin   Neurological:      General: No focal deficit present.      Mental Status: He is alert. Mental status is at baseline.      Cranial Nerves: No cranial nerve deficit.   Psychiatric:         Behavior: Behavior normal.           LABS:   A1C:      CBC:  Recent Labs   Lab 01/10/23  1510   WBC 7.6   RBC 5.38   Hemoglobin 17.8 H   Hematocrit 49.8   Platelets 198   MCV 92.6   MCH 33.1 H   MCHC 35.7     CMP:  Recent Labs   Lab 01/10/23  1510   Glucose 107 H   Calcium 9.1   Albumin 4.3   Total Protein 6.8   Sodium 139   Potassium 4.6   CO2 25   Chloride 105   BUN 16   Creatinine 0.93   ALT 20   AST 20   Total Bilirubin 0.4     LIPIDS:      TSH:          ASSESSMENT & PLAN:    Problem List Items Addressed This Visit          Neuro    Chronic nonintractable headache       Cardiac/Vascular    Hypertension (Chronic)    Overview     Well controlled. Continue current regimen           Hyperlipidemia    Overview     Stable. Continue statin            Endocrine    Hypothyroidism (Chronic)    Overview     Well controlled. Continue current regimen           Male hypogonadism (Chronic)    Overview     Stable. Followed by Urology         Prediabetes - Primary (Chronic)    Overview     Last A1c is 5.9 on 4/2024  The patient is asked to  make an attempt to improve diet and exercise patterns to aid in medical management of this problem.                Follow up in about 6 months (around 11/28/2024) for blood pressure.      RTC/ED precautions discussed where applicable.   Encouraged patient to let me know if there are any further questions/concerns.     Advise patient/caretaker to check with insurance regarding orders to avoid unexpected fees/costs.     The patient/caretaker indicates understanding of these issues and agrees with the plan.    Dr. Karmen Molina MD  Family Medicine

## 2024-05-29 DIAGNOSIS — R73.03 PREDIABETES: ICD-10-CM

## 2024-07-30 ENCOUNTER — PATIENT MESSAGE (OUTPATIENT)
Dept: FAMILY MEDICINE | Facility: CLINIC | Age: 64
End: 2024-07-30
Payer: COMMERCIAL

## 2024-07-30 RX ORDER — LEVOTHYROXINE SODIUM 50 UG/1
50 TABLET ORAL DAILY
Qty: 90 TABLET | Refills: 3 | Status: CANCELLED | OUTPATIENT
Start: 2024-07-30

## 2024-07-30 NOTE — TELEPHONE ENCOUNTER
No care due was identified.  [facility] Embedded Care Due Messages. Reference number: [id number].   7/30/2024 2:33:35 PM CDT

## 2024-07-31 RX ORDER — ATORVASTATIN CALCIUM 10 MG/1
10 TABLET, FILM COATED ORAL DAILY
Qty: 90 TABLET | Refills: 3 | Status: SHIPPED | OUTPATIENT
Start: 2024-07-31

## 2024-09-20 ENCOUNTER — PATIENT MESSAGE (OUTPATIENT)
Dept: FAMILY MEDICINE | Facility: CLINIC | Age: 64
End: 2024-09-20
Payer: COMMERCIAL

## 2024-09-20 RX ORDER — SPIRONOLACTONE AND HYDROCHLOROTHIAZIDE 25; 25 MG/1; MG/1
1 TABLET ORAL DAILY
Qty: 90 TABLET | Refills: 3 | Status: SHIPPED | OUTPATIENT
Start: 2024-09-20

## 2024-09-20 RX ORDER — LOSARTAN POTASSIUM 100 MG/1
50 TABLET ORAL 2 TIMES DAILY
Qty: 180 TABLET | Refills: 3 | Status: SHIPPED | OUTPATIENT
Start: 2024-09-20

## 2024-09-20 NOTE — TELEPHONE ENCOUNTER
Care Due:                  Date            Visit Type   Department     Provider  --------------------------------------------------------------------------------                                [facility] PAT[facility]                              [facility]  [facility]: [facility]      [facility]                               -                              [facility]  Next Visit: 12-      [facility]   [facility]                                                            Last  Test          Frequency    Reason                     Performed    Due Date  --------------------------------------------------------------------------------    CMP.........  12 months..  atorvastatin.............  01-   01-    Lipid Panel.  12 months..  atorvastatin.............  04- 09-    [facility] Embedded Care Due Messages. Reference number: [id number].   9/20/2024 10:41:44 AM CDT

## 2024-11-19 ENCOUNTER — PATIENT MESSAGE (OUTPATIENT)
Dept: ADMINISTRATIVE | Facility: HOSPITAL | Age: 64
End: 2024-11-19
Payer: COMMERCIAL

## 2024-11-20 ENCOUNTER — PATIENT OUTREACH (OUTPATIENT)
Dept: ADMINISTRATIVE | Facility: HOSPITAL | Age: 64
End: 2024-11-20
Payer: COMMERCIAL

## 2024-11-20 DIAGNOSIS — Z12.11 COLON CANCER SCREENING: Primary | ICD-10-CM

## 2024-12-10 ENCOUNTER — OFFICE VISIT (OUTPATIENT)
Dept: FAMILY MEDICINE | Facility: CLINIC | Age: 64
End: 2024-12-10
Payer: COMMERCIAL

## 2024-12-10 VITALS
HEART RATE: 62 BPM | BODY MASS INDEX: 30.67 KG/M2 | DIASTOLIC BLOOD PRESSURE: 72 MMHG | WEIGHT: 202.38 LBS | OXYGEN SATURATION: 98 % | SYSTOLIC BLOOD PRESSURE: 126 MMHG | HEIGHT: 68 IN

## 2024-12-10 DIAGNOSIS — E78.5 HYPERLIPIDEMIA, UNSPECIFIED HYPERLIPIDEMIA TYPE: ICD-10-CM

## 2024-12-10 DIAGNOSIS — R73.03 PREDIABETES: Chronic | ICD-10-CM

## 2024-12-10 DIAGNOSIS — Z12.11 COLON CANCER SCREENING: ICD-10-CM

## 2024-12-10 DIAGNOSIS — E03.9 HYPOTHYROIDISM, UNSPECIFIED TYPE: Chronic | ICD-10-CM

## 2024-12-10 DIAGNOSIS — I10 HYPERTENSION, UNSPECIFIED TYPE: Primary | Chronic | ICD-10-CM

## 2024-12-10 DIAGNOSIS — E66.811 CLASS 1 OBESITY DUE TO EXCESS CALORIES WITH SERIOUS COMORBIDITY AND BODY MASS INDEX (BMI) OF 30.0 TO 30.9 IN ADULT: ICD-10-CM

## 2024-12-10 DIAGNOSIS — E29.1 MALE HYPOGONADISM: Chronic | ICD-10-CM

## 2024-12-10 DIAGNOSIS — E66.09 CLASS 1 OBESITY DUE TO EXCESS CALORIES WITH SERIOUS COMORBIDITY AND BODY MASS INDEX (BMI) OF 30.0 TO 30.9 IN ADULT: ICD-10-CM

## 2024-12-10 PROCEDURE — 99214 OFFICE O/P EST MOD 30 MIN: CPT | Mod: S$GLB,,, | Performed by: FAMILY MEDICINE

## 2024-12-10 PROCEDURE — 99999 PR PBB SHADOW E&M-EST. PATIENT-LVL III: CPT | Mod: PBBFAC,,, | Performed by: FAMILY MEDICINE

## 2024-12-10 RX ORDER — ATORVASTATIN CALCIUM 10 MG/1
10 TABLET, FILM COATED ORAL DAILY
Qty: 90 TABLET | Refills: 3 | Status: SHIPPED | OUTPATIENT
Start: 2024-12-10

## 2024-12-10 RX ORDER — BUTALBITAL, ACETAMINOPHEN AND CAFFEINE 50; 325; 40 MG/1; MG/1; MG/1
1 TABLET ORAL EVERY 6 HOURS PRN
Qty: 30 TABLET | Refills: 0 | Status: SHIPPED | OUTPATIENT
Start: 2024-12-10

## 2024-12-10 RX ORDER — TESTOSTERONE UNDECANOATE 158 MG/1
316 CAPSULE, LIQUID FILLED ORAL 2 TIMES DAILY
COMMUNITY
Start: 2024-12-05

## 2024-12-10 RX ORDER — LOSARTAN POTASSIUM 100 MG/1
50 TABLET ORAL 2 TIMES DAILY
Qty: 180 TABLET | Refills: 3 | Status: SHIPPED | OUTPATIENT
Start: 2024-12-10

## 2024-12-10 RX ORDER — LEVOTHYROXINE SODIUM 50 UG/1
50 TABLET ORAL DAILY
Qty: 90 TABLET | Refills: 3 | Status: SHIPPED | OUTPATIENT
Start: 2024-12-10
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
## 2025-03-27 RX ORDER — LEVOTHYROXINE SODIUM 50 UG/1
50 TABLET ORAL DAILY
Qty: 90 TABLET | Refills: 1 | Status: SHIPPED | OUTPATIENT
Start: 2025-03-27

## 2025-03-27 RX ORDER — ATORVASTATIN CALCIUM 10 MG/1
10 TABLET, FILM COATED ORAL DAILY
Qty: 90 TABLET | Refills: 1 | Status: SHIPPED | OUTPATIENT
Start: 2025-03-27

## 2025-03-27 RX ORDER — LOSARTAN POTASSIUM 100 MG/1
50 TABLET ORAL 2 TIMES DAILY
Qty: 180 TABLET | Refills: 1 | Status: SHIPPED | OUTPATIENT
Start: 2025-03-27

## 2025-03-27 RX ORDER — SPIRONOLACTONE AND HYDROCHLOROTHIAZIDE 25; 25 MG/1; MG/1
1 TABLET ORAL DAILY
Qty: 90 TABLET | Refills: 1 | Status: SHIPPED | OUTPATIENT
Start: 2025-03-27

## 2025-03-27 NOTE — TELEPHONE ENCOUNTER
----- Message from Sue sent at 3/27/2025 11:31 AM CDT -----  Regarding: Юлия Roberts  Type: RX Refill Request Who Called:Kathy  Have you contacted your pharmacy:  Refill or New Rx: levothyroxine (SYNTHROID) 50 MCG tablet , atorvastatin (LIPITOR) 10 MG tablet, spironolactone-hydrochlorothiazide 25-25mg (ALDACTAZIDE) 25-25 mg Tab, losartan (COZAAR) 100 MG tablet       RX Name and Strength: How is the patient currently taking it? (ex. 1XDay): Is this a 30 day or 90 day RX: [facility] - [facility] [address] [facility] [id number]: [phone] Fax: [phone]  Local or Mail Order: Ordering Provider: Would the patient rather a call back or a response via [facility]? Call back Best Call Back Number: [phone]  Additional Information:

## 2025-03-27 NOTE — TELEPHONE ENCOUNTER
Care Due:                  Date            Visit Type   Department     Provider  --------------------------------------------------------------------------------                                EP -                              PRIMARY [facility]  Last Visit: 12-      CARE ([facility])   [facility]                              [facility]  Next Visit: 06-      [facility] ([facility]                                                            Last  Test          Frequency    Reason                     Performed    Due Date  --------------------------------------------------------------------------------    CMP.........  12 months..  atorvastatin, losartan,    Not Found    Overdue                             spironolactone-hydrochlor                             othiazide................    Lipid Panel.  12 months..  atorvastatin.............  05- 03-    TSH.........  12 months..  levothyroxine............  Not Found    Overdue    [employer] Embedded Care Due Messages. Reference number: [id number].   3/27/2025 11:43:29 AM CDT

## 2025-06-06 ENCOUNTER — TELEPHONE (OUTPATIENT)
Dept: HEMATOLOGY/ONCOLOGY | Facility: CLINIC | Age: 65
End: 2025-06-06

## 2025-06-06 ENCOUNTER — TELEPHONE (OUTPATIENT)
Dept: FAMILY MEDICINE | Facility: HOSPITAL | Age: 65
End: 2025-06-06

## 2025-06-06 DIAGNOSIS — D58.2 ELEVATED HEMOGLOBIN: Primary | ICD-10-CM

## 2025-06-06 DIAGNOSIS — R71.8 ELEVATED HEMATOCRIT: ICD-10-CM

## 2025-06-09 ENCOUNTER — TELEPHONE (OUTPATIENT)
Dept: FAMILY MEDICINE | Facility: HOSPITAL | Age: 65
End: 2025-06-09
Payer: MEDICARE

## 2025-06-09 ENCOUNTER — RESULTS FOLLOW-UP (OUTPATIENT)
Dept: FAMILY MEDICINE | Facility: CLINIC | Age: 65
End: 2025-06-09

## 2025-06-09 NOTE — TELEPHONE ENCOUNTER
Marti from [facility] lab called with a Critical lab result of Hemoglobin-20.8. High Priority message sent to Nani Aguirre NP and staff. Value entered into Flowsheets.

## 2025-06-10 NOTE — PROGRESS NOTES
PATIENT: Rashaad Amos  MRN: [id number]  DATE: 6/11/2025    Scribe Attestation: I, Alice Tan, am scribing under the direction and in the presence of Penny Richards MD. I attest that this documentation is true, accurate and complete to the best of my knowledge.    Diagnosis:   1. Elevated hemoglobin    2. Elevated hematocrit    3. Other specified counseling    4. BMI 31.0-31.9,adult    5. Weight gain    6. Abdominal muscle pain    7. Chronic nonintractable headache, unspecified headache type    8. Exercise counseling    9. Dietary counseling    10. Class 1 obesity due to excess calories with serious comorbidity and body mass index (BMI) of 31.0 to 31.9 in adult    11. Advanced directives, counseling/discussion      Chief Complaint: Evaluation of elevated Hgb and Hct    Oncologic History:      Oncologic History     Oncologic Treatment     Pathology       Subjective:    History of Present Illness: Mr. Amos is a 65 y.o. male who presents for evaluation and management of elevated H&H. Patient was referred to me by his PCP Dr. Karmen Molina. Patient has never smoked. Patient is currently on testosterone treatments. He has had a history of elevated Hgb and Hct that he was told due to androgel treatments by his urologist.    Today, patient endorses weight gain and abdominal muscle pain. Abdominal muscle pain because this year. He has not been eating a healthy diet. Denies issues with eating. Denies urinary issues, constipation, diarrhea, black/bloody stools, heartburn. He reports that he has had HA since he was a child. Current HA are unchanged from what he has experienced as a child.     He endorses intermittent CP that occurs briefly and resolves on its own.    No abnormal lumps, bumps, or abnormal masses palpated. Pt has no fever, chills, rigors, or night sweats. Pt denies confusion or Lhermitte symptomatology. No C/T/L spine or other back pain. Denies blurry vision. No changes in urinary habits. Pt has no cough or  labored breathing. Denies palpitation, chest pain with deep breathing, or leg swelling.     Past medical, surgical, family, and social histories have been reviewed and updated below.    Past Medical History:   Past Medical History:   Diagnosis Date    Hypertension      Past Surgical History: History reviewed. No pertinent surgical history.  Family History:   Family History   Adopted: Yes   Family history unknown: Yes     Social History:  reports that he has never smoked. He has never been exposed to tobacco smoke. He has never used smokeless tobacco. He reports current alcohol use. He reports that he does not use drugs.    Allergies:  Review of patient's allergies indicates:  No Known Allergies    Medications:  Current Outpatient Medications   Medication Sig Dispense Refill    ASCORBIC ACID, VITAMIN C, ORAL Take by mouth.      atorvastatin (LIPITOR) 10 MG tablet Take 1 tablet (10 mg total) by mouth once daily. 90 tablet 1    butalbital-acetaminophen-caffeine -40 mg (FIORICET, ESGIC) -40 mg per tablet Take 1 tablet by mouth every 6 (six) hours as needed for Pain. 30 tablet 0    JATENZO 158 mg Cap Take 316 mg by mouth 2 (two) times daily.      levothyroxine (SYNTHROID) 50 MCG tablet Take 1 tablet (50 mcg total) by mouth once daily. 90 tablet 1    losartan (COZAAR) 100 MG tablet Take 0.5 tablets (50 mg total) by mouth 2 (two) times a day. 180 tablet 1    omega-3 fatty acids 1,000 mg Cap Take 2 g by mouth.      spironolactone-hydrochlorothiazide 25-25mg (ALDACTAZIDE) 25-25 mg Tab Take 1 tablet by mouth once daily. 90 tablet 1    tadalafiL (CIALIS) 20 MG Tab Take 20 mg by mouth daily as needed.      testosterone (ANDROGEL) 20.25 mg/1.25 gram (1.62 %) GlPm 20.25 mg 4 (four) times daily. (Patient not taking: Reported on 6/11/2025)       No current facility-administered medications for this visit.       Review of Systems  Comprehensive ROS is negative except for what was mentioned in HPI.     ECOG Performance  Status:   ECOG SCORE           Objective:      Vitals:   Vitals:    06/11/25 1437   BP: (!) 134/93   BP Location: Left arm   Patient Position: Sitting   Pulse: (!) 116   SpO2: (!) 94%   Weight: 93.1 kg (205 lb 4 oz)     BMI: Body mass index is 31.21 kg/m².    Physical Exam  Vitals and nursing note reviewed.   Constitutional:       General: He is not in acute distress.     Appearance: He is obese. He is not toxic-appearing or diaphoretic.      Comments: APPEARS ENERGETIC/NORMAL ENERGY   HENT:      Head: Normocephalic and atraumatic.      Right Ear: External ear normal.      Left Ear: External ear normal.      Nose: Nose normal. No congestion or rhinorrhea.      Mouth/Throat:      Mouth: Mucous membranes are moist.      Pharynx: Uvula midline. No oropharyngeal exudate or posterior oropharyngeal erythema.      Comments: NO HALITOSIS  Eyes:      General: Lids are normal. Vision grossly intact.      Extraocular Movements: Extraocular movements intact.      Right eye: No nystagmus.      Left eye: No nystagmus.      Conjunctiva/sclera: Conjunctivae normal.      Pupils: Pupils are equal, round, and reactive to light.   Cardiovascular:      Rate and Rhythm: Normal rate and regular rhythm.      Pulses: Normal pulses.      Heart sounds: Normal heart sounds.      Comments: Retains pretibial hair  Pulmonary:      Effort: Pulmonary effort is normal. No respiratory distress.      Breath sounds: Normal breath sounds. No stridor. No wheezing, rhonchi or rales.      Comments: NO PLEURITIC CHEST PAIN  Chest:      Chest wall: No tenderness.   Abdominal:      General: Bowel sounds are normal. There is no distension.      Palpations: Abdomen is soft. There is no fluid wave, hepatomegaly, splenomegaly or mass.      Tenderness: There is no abdominal tenderness. There is no right CVA tenderness, left CVA tenderness, guarding or rebound.      Comments: NO ORGANOMEGALY   Musculoskeletal:         General: No swelling or tenderness. Normal  range of motion.      Cervical back: Normal range of motion and neck supple. No rigidity.      Right lower leg: No edema.      Left lower leg: No edema.      Comments: NO CORDS PALPABLE, NEGATIVE HOMANS   Lymphadenopathy:      Head:      Right side of head: No submental, submandibular, tonsillar, preauricular, posterior auricular or occipital adenopathy.      Left side of head: No submental, submandibular, tonsillar, preauricular, posterior auricular or occipital adenopathy.      Cervical: No cervical adenopathy.      Right cervical: No superficial, deep or posterior cervical adenopathy.     Left cervical: No superficial, deep or posterior cervical adenopathy.      Upper Body:      Right upper body: No supraclavicular, axillary or epitrochlear adenopathy.      Left upper body: No supraclavicular, axillary or epitrochlear adenopathy.      Lower Body: No right inguinal adenopathy. No left inguinal adenopathy.   Skin:     General: Skin is warm and dry.      Coloration: Skin is not ashen, cyanotic, jaundiced, mottled or pale.      Findings: No bruising, erythema or rash.      Comments: -NO PETECHIAE  -NO OBVIOUS LESIONS BUT THOROUGH EXAM NOT CARRIED OUT   Neurological:      General: No focal deficit present.      Mental Status: He is alert and oriented to person, place, and time.      Motor: No weakness.      Gait: Gait normal.      Deep Tendon Reflexes: Reflexes abnormal.      Comments: NO CLONUS  ABSENT LHERMITTE'S  Patellar reflexes decreased to 1 symmetrically I suspect due to obesity--less ability to detect/perhaps not fully relaxed--no clonus--no other neuro abn.   Psychiatric:         Mood and Affect: Mood normal.         Behavior: Behavior normal. Behavior is cooperative.         Judgment: Judgment normal.     Laboratory Data:  Labs have been reviewed.    Lab Results   Component Value Date    WBC 8.86 06/09/2025    RBC 6.48 (H) 06/09/2025    HGB 20.8 (HH) 06/09/2025    HCT 59.6 (H) 06/09/2025    MCV 92  06/09/2025    MCH 32.1 (H) 06/09/2025    MCHC 34.9 06/09/2025    RDW 13.7 06/09/2025     06/09/2025    MPV 11.0 06/09/2025    GRAN 6.4 05/27/2016    GRAN 63.3 05/27/2016    LYMPH 34.8 06/09/2025    LYMPH 3.08 06/09/2025    MONO 9.8 06/09/2025    MONO 0.87 06/09/2025    EOS 4.1 06/09/2025    EOS 0.36 06/09/2025    BASO 91 01/10/2023    EOSINOPHIL 4.5 01/10/2023    BASOPHIL 1.1 06/09/2025    BASOPHIL 0.10 06/09/2025     Lab Results   Component Value Date    IRON 142 02/23/2022    TIBC 285 02/23/2022    LABIRON 50 (H) 02/23/2022      Lab Results   Component Value Date    FERRITIN 70 02/23/2022     CMP  Sodium   Date Value Ref Range Status   06/06/2025 142 136 - 145 mmol/L Final   04/08/2024 138 135 - 146 mmol/L Final   01/10/2023 139 135 - 146 mmol/L Final   01/04/2022 142 135 - 146 Final     Potassium   Date Value Ref Range Status   06/06/2025 4.0 3.5 - 5.1 mmol/L Final   04/08/2024 4.1 3.5 - 5.3 mmol/L Final   01/10/2023 4.6 3.5 - 5.3 mmol/L Final   01/04/2022 5.1 3.5 - 5.3 Final     Chloride   Date Value Ref Range Status   06/06/2025 110 95 - 110 mmol/L Final   01/10/2023 105 98 - 110 mmol/L Final   01/04/2022 106 98 - 110 Final     CO2   Date Value Ref Range Status   06/06/2025 26 23 - 29 mmol/L Final   01/10/2023 25 20 - 32 mmol/L Final   01/04/2022 27 20 - 32 Final     Carbon Dioxide   Date Value Ref Range Status   04/08/2024 23 20 - 32 mmol/L Final     Glucose   Date Value Ref Range Status   06/06/2025 103 70 - 110 mg/dL Final   04/08/2024 93 65 - 99 mg/dL Final     Comment:                  Fasting reference interval   01/10/2023 107 (H) 65 - 99 mg/dL Final     Comment:                   Fasting reference interval     For someone without known diabetes, a glucose value  between 100 and 125 mg/dL is consistent with  prediabetes and should be confirmed with a  follow-up test.          BUN   Date Value Ref Range Status   06/06/2025 23 8 - 23 mg/dL Final   01/04/2022 16 7 - 25 Final     Creatinine   Date  Value Ref Range Status   06/06/2025 1.0 0.5 - 1.4 mg/dL Final   01/04/2022 0.81 0.70 - 1.25 Final     Calcium   Date Value Ref Range Status   06/06/2025 9.1 8.7 - 10.5 mg/dL Final   04/08/2024 9.5 8.6 - 10.3 mg/dL Final   01/10/2023 9.1 8.6 - 10.3 mg/dL Final   01/04/2022 10.0 8.6 - 10.3 Final     Protein Total   Date Value Ref Range Status   06/06/2025 7.3 6.0 - 8.4 gm/dL Final     Total Protein   Date Value Ref Range Status   01/10/2023 6.8 6.1 - 8.1 g/dL Final     Albumin   Date Value Ref Range Status   06/06/2025 4.0 3.5 - 5.2 g/dL Final   01/10/2023 4.3 3.6 - 5.1 g/dL Final   01/04/2022 4.7 3.6 - 5.1 Final     Albumin Level   Date Value Ref Range Status   04/08/2024 4.6 3.6 - 5.1 g/dL Final     Total Bilirubin   Date Value Ref Range Status   04/08/2024 0.5 0.2 - 1.2 mg/dL Final   01/10/2023 0.4 0.2 - 1.2 mg/dL Final     Bilirubin Total   Date Value Ref Range Status   06/06/2025 0.5 0.1 - 1.0 mg/dL Final     Comment:     For infants and newborns, interpretation of results should be based   on gestational age, weight and in agreement with clinical   observations.    Premature Infant recommended reference ranges:   0-24 hours:  <8.0 mg/dL   24-48 hours: <12.0 mg/dL   3-5 days:    <15.0 mg/dL   6-29 days:   <15.0 mg/dL     Alkaline Phosphatase   Date Value Ref Range Status   04/08/2024 71 35 - 144 U/L Final   05/27/2016 52 38 - 126 U/L Final     ALP   Date Value Ref Range Status   06/06/2025 51 40 - 150 unit/L Final     AST   Date Value Ref Range Status   06/06/2025 23 11 - 45 unit/L Final   04/08/2024 25 10 - 35 U/L Final   01/10/2023 20 10 - 35 U/L Final     ALT   Date Value Ref Range Status   06/06/2025 35 10 - 44 unit/L Final   04/08/2024 32 9 - 46 U/L Final   01/10/2023 20 9 - 46 U/L Final   01/04/2022 26 9 - 46 Final     Anion Gap   Date Value Ref Range Status   06/06/2025 6 (L) 8 - 16 mmol/L Final     eGFR   Date Value Ref Range Status   06/06/2025 >60 >60 mL/min/1.73/m2 Final     Comment:     Estimated GFR  calculated using the CKD-EPI creatinine (2021) equation.   01/10/2023 93 > OR = 60 mL/min/1.73m2 Final     Comment:     The eGFR is based on the CKD-EPI 2021 equation. To calculate   the new eGFR from a previous Creatinine or Cystatin C  result, go to https://www.kidney.org/professionals/  kdoqi/gfr%5Fcalculator       Lab Results   Component Value Date    PSA 1.33 04/11/2025     Lab Results   Component Value Date    TSH 1.099 06/06/2025     Lab Results   Component Value Date    PSA 1.33 04/11/2025     TESTOSTERONE on 04/11/2025      Component  Ref Range & Units (hover) 2 mo ago   Testosterone Total 1,073     TESTOSTERONE on 10/17/2024   Ref Range & Units 7 mo ago   Testosterone, Totalm Males (Adult), [place] - [facility] 250 - 827 ng/dL 1,320 High      Pathology:   None today    Imaging:    None today     Assessment:       1. Elevated hemoglobin    2. Elevated hematocrit    3. Other specified counseling    4. BMI 31.0-31.9,adult    5. Weight gain    6. Abdominal muscle pain    7. Chronic nonintractable headache, unspecified headache type    8. Exercise counseling    9. Dietary counseling    10. Class 1 obesity due to excess calories with serious comorbidity and body mass index (BMI) of 31.0 to 31.9 in adult    11. Advanced directives, counseling/discussion      Elevated hemoglobin/Hematocrit:  - Likely due to long-term testosterone treatments.  - Ordered EPO level, Jak2 Exon 12 and other non-V617 mutation detestion (Blood), LDH, and testosterone to be collected today    Diet and Exercise Counseling:  - Counseled on avoiding concentrated sweets, preservatives, fatty/greasy/fried foods, and high carb foods. Recommended eating grass-fed meats and butter, fruits, and vegetables.  - Counseled on cardio exercise 30 min/6 days a week as an anti-cancer, anti-diabetes, anti-stroke, mood lifting, etc. May start with a few minutes per day and build up. Also counseled on weight-bearing and muscle-strengthening exercises to maintain  muscle mass. Both types of exercise are life-prolonging and improve quality of life.   - Discussed reaching target heart rate (HR) for cardio exercise. To find your target cardio heart rate, you'll need to first estimate your maximum heart rate, which is generally calculated as 220 minus your age. Then, your target heart rate range for moderate intensity exercise is typically 50% to 70% of your maximum heart rate, and for vigorous intensity, it's 70% to 85%.   - First, find your estimated maximum HR (EMHR):  220 - your age = EMHR  - Next, multiply your estimated maximum heart rate by 50% and 70% for moderate intensity and 70% and 85% for vigorous intensity.    EMHR x 0.50 = Losest HR for moderate intensity workout  EMHR x 0.70 = Highest HR for moderate intensity workout    EMHR x 0.70 = Lowest HR for vigorous intensity workout  EMHR x 0.85 = Highest HR for vigorous intensity workout    COMORBIDITIES  Chronic HA - started in childhood  HTN - follows with PCP, previously followed with cardiology  HLD  Hypothyroidism  Prediabetes  Body mass index is 31.21 kg/m².    Advanced directive counseling:  - Discussed advanced directives and living will. I explained to patient that a living will is only required when his condition is terminal and irreversible as states by at least two physicians. A living will must be notarized. A copy of the living will can be found at [place].gov  - Patient voiced understanding of counseling.     Plan:     Today I counseled pt about the following:  DIET AND EXERCISE, TODAYS PLANS: FOLLOW UP, REFERRALS, TREATMENT DETAILS, LIVING WILL, and ADVANCED DIRECTIVES  Patient exhibits understanding of all counseling today.    RTC in 3 weeks to review labs    I spent a total of 33 minutes in a combination of focused physical exam and history, reviewing outside records, reviewing any available radiographs, counseling, communicating with other health care professionals regarding this patients medical  condition, independently interpreting results, developing diagnostic and treatment plan and documenting in the EMR.     Participating Clinicians:  PCP-see story board    Disclaimer: This note was prepared using voice recognition system and is likely to have sound alike errors.  Please interpret accordingly.    Penny Richards M.D.  Hematology/Oncology  [facility] [address], [address]  [place] [address]  Phone: [phone]  Fax: [phone]

## 2025-06-11 ENCOUNTER — PATIENT MESSAGE (OUTPATIENT)
Dept: FAMILY MEDICINE | Facility: CLINIC | Age: 65
End: 2025-06-11

## 2025-06-11 ENCOUNTER — TELEPHONE (OUTPATIENT)
Dept: FAMILY MEDICINE | Facility: CLINIC | Age: 65
End: 2025-06-11

## 2025-06-11 ENCOUNTER — OFFICE VISIT (OUTPATIENT)
Dept: HEMATOLOGY/ONCOLOGY | Facility: CLINIC | Age: 65
End: 2025-06-11
Payer: MEDICARE

## 2025-06-11 VITALS
BODY MASS INDEX: 31.21 KG/M2 | WEIGHT: 205.25 LBS | DIASTOLIC BLOOD PRESSURE: 93 MMHG | OXYGEN SATURATION: 94 % | SYSTOLIC BLOOD PRESSURE: 134 MMHG | HEART RATE: 116 BPM

## 2025-06-11 DIAGNOSIS — Z71.89 OTHER SPECIFIED COUNSELING: ICD-10-CM

## 2025-06-11 DIAGNOSIS — R71.8 ELEVATED HEMATOCRIT: ICD-10-CM

## 2025-06-11 DIAGNOSIS — G89.29 CHRONIC NONINTRACTABLE HEADACHE, UNSPECIFIED HEADACHE TYPE: ICD-10-CM

## 2025-06-11 DIAGNOSIS — E66.09 CLASS 1 OBESITY DUE TO EXCESS CALORIES WITH SERIOUS COMORBIDITY AND BODY MASS INDEX (BMI) OF 31.0 TO 31.9 IN ADULT: ICD-10-CM

## 2025-06-11 DIAGNOSIS — D58.2 ELEVATED HEMOGLOBIN: Primary | ICD-10-CM

## 2025-06-11 DIAGNOSIS — R63.5 WEIGHT GAIN: ICD-10-CM

## 2025-06-11 DIAGNOSIS — E66.811 CLASS 1 OBESITY DUE TO EXCESS CALORIES WITH SERIOUS COMORBIDITY AND BODY MASS INDEX (BMI) OF 31.0 TO 31.9 IN ADULT: ICD-10-CM

## 2025-06-11 DIAGNOSIS — Z71.3 DIETARY COUNSELING: ICD-10-CM

## 2025-06-11 DIAGNOSIS — R51.9 CHRONIC NONINTRACTABLE HEADACHE, UNSPECIFIED HEADACHE TYPE: ICD-10-CM

## 2025-06-11 DIAGNOSIS — M79.18 ABDOMINAL MUSCLE PAIN: ICD-10-CM

## 2025-06-11 DIAGNOSIS — Z71.89 ADVANCED DIRECTIVES, COUNSELING/DISCUSSION: ICD-10-CM

## 2025-06-11 DIAGNOSIS — Z71.82 EXERCISE COUNSELING: ICD-10-CM

## 2025-06-11 PROCEDURE — 99214 OFFICE O/P EST MOD 30 MIN: CPT | Mod: PBBFAC,PN | Performed by: INTERNAL MEDICINE

## 2025-06-11 PROCEDURE — 99999 PR PBB SHADOW E&M-EST. PATIENT-LVL IV: CPT | Mod: PBBFAC,,, | Performed by: INTERNAL MEDICINE

## 2025-06-11 NOTE — TELEPHONE ENCOUNTER
----- Message from Clair sent at 6/11/2025  9:27 AM CDT -----  Type:  Patient Returning CallWho Called:pt CARLOS MERCADO [[id number]]Who Left Message for Patient:Anuja Does the patient know what this is regarding?:cancelled appt Would the patient rather a call back or a response via [facility]? Call back Best Call Back Number:[phone] Additional Information: pt requesting a call back in regards to appt for 06/11 that was cancelled next available appt 01/15 pt declined

## 2025-06-13 ENCOUNTER — OFFICE VISIT (OUTPATIENT)
Dept: FAMILY MEDICINE | Facility: CLINIC | Age: 65
End: 2025-06-13
Payer: MEDICARE

## 2025-06-13 VITALS
WEIGHT: 202.69 LBS | OXYGEN SATURATION: 95 % | HEIGHT: 68 IN | HEART RATE: 73 BPM | SYSTOLIC BLOOD PRESSURE: 128 MMHG | BODY MASS INDEX: 30.72 KG/M2 | DIASTOLIC BLOOD PRESSURE: 76 MMHG

## 2025-06-13 DIAGNOSIS — R71.8 ELEVATED HEMATOCRIT: ICD-10-CM

## 2025-06-13 DIAGNOSIS — E66.09 CLASS 1 OBESITY DUE TO EXCESS CALORIES WITH SERIOUS COMORBIDITY AND BODY MASS INDEX (BMI) OF 30.0 TO 30.9 IN ADULT: ICD-10-CM

## 2025-06-13 DIAGNOSIS — E29.1 MALE HYPOGONADISM: Chronic | ICD-10-CM

## 2025-06-13 DIAGNOSIS — Z23 NEED FOR PNEUMOCOCCAL VACCINE: ICD-10-CM

## 2025-06-13 DIAGNOSIS — G89.29 CHRONIC NONINTRACTABLE HEADACHE, UNSPECIFIED HEADACHE TYPE: ICD-10-CM

## 2025-06-13 DIAGNOSIS — E66.811 CLASS 1 OBESITY DUE TO EXCESS CALORIES WITH SERIOUS COMORBIDITY AND BODY MASS INDEX (BMI) OF 30.0 TO 30.9 IN ADULT: ICD-10-CM

## 2025-06-13 DIAGNOSIS — I10 HYPERTENSION, UNSPECIFIED TYPE: Primary | Chronic | ICD-10-CM

## 2025-06-13 DIAGNOSIS — E03.9 HYPOTHYROIDISM, UNSPECIFIED TYPE: Chronic | ICD-10-CM

## 2025-06-13 DIAGNOSIS — E78.5 HYPERLIPIDEMIA, UNSPECIFIED HYPERLIPIDEMIA TYPE: ICD-10-CM

## 2025-06-13 DIAGNOSIS — R51.9 CHRONIC NONINTRACTABLE HEADACHE, UNSPECIFIED HEADACHE TYPE: ICD-10-CM

## 2025-06-13 DIAGNOSIS — D58.2 ELEVATED HEMOGLOBIN: ICD-10-CM

## 2025-06-13 PROCEDURE — 99999PBSHW PR PBB SHADOW TECHNICAL ONLY FILED TO HB: Mod: PBBFAC,,,

## 2025-06-13 PROCEDURE — G0009 ADMIN PNEUMOCOCCAL VACCINE: HCPCS | Mod: PBBFAC,PN

## 2025-06-13 PROCEDURE — 99215 OFFICE O/P EST HI 40 MIN: CPT | Mod: PBBFAC,PN

## 2025-06-13 PROCEDURE — 99999 PR PBB SHADOW E&M-EST. PATIENT-LVL V: CPT | Mod: PBBFAC,,,

## 2025-06-13 PROCEDURE — 90677 PCV20 VACCINE IM: CPT | Mod: PBBFAC,PN

## 2025-06-13 PROCEDURE — 99214 OFFICE O/P EST MOD 30 MIN: CPT | Mod: S$PBB,,,

## 2025-06-13 RX ORDER — BUTALBITAL, ACETAMINOPHEN AND CAFFEINE 50; 325; 40 MG/1; MG/1; MG/1
1 TABLET ORAL EVERY 6 HOURS PRN
Qty: 30 TABLET | Refills: 1 | Status: SHIPPED | OUTPATIENT
Start: 2025-06-13

## 2025-06-13 RX ADMIN — PNEUMOCOCCAL 20-VALENT CONJUGATE VACCINE 0.5 ML
2.2; 2.2; 2.2; 2.2; 2.2; 2.2; 2.2; 2.2; 2.2; 2.2; 2.2; 2.2; 2.2; 2.2; 2.2; 2.2; 4.4; 2.2; 2.2; 2.2 INJECTION, SUSPENSION INTRAMUSCULAR at 08:06

## 2025-06-13 NOTE — PATIENT INSTRUCTIONS
To help lower your cholesterol Recommend the following lifestyle modifications:   -regular aerobic activity to include at least 150 minutes/week of moderate-intensity activity, 75 minutes/week of vigorous intensity activity, or an equivalent of both).   -Limit alcohol consumption to no more than two drinks per day for males   - reduction of intake of simple carbohydrates, reducing saturated fats. Recommend increased consumption of fish that contains high amounts of omega-3 fatty acids.     Work on diet modification and exercise a few times per week.   Make sure you are staying hydrated.

## 2025-06-13 NOTE — PROGRESS NOTES
Subjective:              Chief Complaint: 6m f/u bp     Hulaxmi Amos is a 65 y.o. male, patient of Karmen Molina MD with headaches, HLD, HTN, obesity, hypothyroidism, male hypogonadism, prediabetes, unknown to me, presents today for a  6m f/u bp    History of Present Illness    CHIEF COMPLAINT:  Rashaad presents today alone for six-month follow-up. Overall feeling well.     Recently seen by [facility] on Wednesday for critical hemoglobin of 20.2. labs pending, scheduled to follow up in July.     HYPERTENSION:  He has a history of high blood pressure. A reading of 195/unknown was recorded at Dr. Sumit Cuadra's office over two years ago, repeat was 175 systolic. Subsequent cardiology evaluation by Dr. Mora revealed left ventricular hypertrophy. His blood pressure is currently stable with recent self-reported reading of 140, though he notes increases during episodes of discomfort. Blood pressure remains below 130 with current medication regimen. See med list below.     WEIGHT MANAGEMENT:  He reports weight gain from 185 lbs in May 2024 to 202 lbs in December 2024, remaining stable since then despite poor dietary habits including frequent consumption of fried foods. Plans to work on diet and exercise as he was encouraged to lose a few lbs prior to his next visit with [facility].     FOOD SENSITIVITIES:  He reports developing intolerance to spicy foods within the past year, experiencing sweating and reactions when consuming them. He notes temporary swelling of a small gland under his ear after eating spicy foods.    HEADACHES:  He has a history of headaches since childhood, currently managed with butalbital approximately twice monthly with good symptom control.    CURRENT MEDICATIONS:  He takes Jatenzo for testosterone replacement (switched from Androgel due to elevated RBCs), followed by [place]rology at [facility] [place] with an upcoming appointment in October, Losartan 50mg twice daily, and spironolactone-hydrochlorothiazide  "daily in the morning for blood pressure management.    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 He reports occasional alcohol use consisting of 1 beer per week and a glass of wine with meals. He is a non-smoker.      ROS:  General: -fever, -chills, -fatigue, -weight gain, -weight loss  Eyes: -vision changes, -redness, -discharge  ENT: -ear pain, -nasal congestion, -sore throat  Cardiovascular: -chest pain, -palpitations, -lower extremity edema  Respiratory: -cough, -shortness of breath  Gastrointestinal: -abdominal pain, -nausea, -vomiting, -diarrhea, -constipation, -blood in stool  Genitourinary: -dysuria, -hematuria, -frequency  Musculoskeletal: -joint pain, -muscle pain  Skin: -rash, -lesion  Neurological: +headache, -dizziness, -numbness, -tingling  Psychiatric: -anxiety, -depression, -sleep difficulty                Objective:     Vitals:    06/13/25 0757 06/13/25 0823   BP: (!) 140/80 128/76   BP Location: Left arm Left arm   Patient Position: Sitting Sitting   Pulse: 73    SpO2: 95%    Weight: 92 kg (202 lb 11.4 oz)    Height: 5' 8" (1.727 m)           Physical Exam    Vitals: Weight: 202 lbs. Blood pressure: 128/76.  General: No acute distress. Well-developed. Well-nourished.  Eyes: EOMI. Sclerae anicteric.  HENT: Normocephalic. Atraumatic.   Cardiovascular: Regular rate. Regular rhythm. No murmurs. No rubs. No gallops. Normal S1, S2.  Respiratory: Normal respiratory effort. Clear to auscultation bilaterally. No rales. No rhonchi. No wheezing.  Musculoskeletal: No obvious deformity.  Extremities: No lower extremity edema.  Neurological: Alert & oriented x3. No slurred speech. Normal gait.  Psychiatric: Normal mood. Normal affect. Good insight. Good judgment.  Skin: Warm. Dry. No rash.            Laboratory:  CBC:  Recent Labs   Lab Result Units 06/06/25  0659 06/09/25  0755   WBC K/uL 8.88 8.86   RBC M/uL 6.28* 6.48*   HGB gm/dL 20.2* 20.8*   HCT % 57.8* 59.6*   Platelet Count K/uL " "144* 169   MCV fL 92 92   MCH pg 32.2* 32.1*   MCHC g/dL 34.9 34.9     CMP:  Recent Labs   Lab Result Units 06/06/25  0659   Glucose mg/dL 103   Calcium mg/dL 9.1   Albumin g/dL 4.0   Protein Total gm/dL 7.3   Sodium mmol/L 142   Potassium mmol/L 4.0   CO2 mmol/L 26   Chloride mmol/L 110   BUN mg/dL 23   ALP unit/L 51   ALT unit/L 35   AST unit/L 23   Bilirubin Total mg/dL 0.5     URINALYSIS:  No results for input(s): "COLORU", "CLARITYU", "SPECGRAV", "PHUR", "PROTEINUA", "GLUCOSEU", "BILIRUBINCON", "BLOODU", "WBCU", "RBCU", "BACTERIA", "MUCUS", "NITRITE", "LEUKOCYTESUR", "UROBILINOGEN", "HYALINECASTS" in the last 2160 hours.   LIPIDS:  Recent Labs   Lab Result Units 06/06/25  0659   TSH uIU/mL 1.099   HDL Cholesterol mg/dL 44   Cholesterol Total mg/dL 170   Triglyceride mg/dL 178*   LDL Cholesterol mg/dL 90.4   HDL/Cholesterol Ratio % 25.9   Non HDL Cholesterol mg/dL 126   Cholesterol/HDL Ratio  3.9     TSH:  Recent Labs   Lab Result Units 06/06/25  0659   TSH uIU/mL 1.099     A1C:  Recent Labs   Lab Result Units 06/06/25  0659   Hemoglobin A1c % 5.5       Assessment:         ICD-10-CM ICD-9-CM   1. Hypertension, unspecified type  I10 401.9   2. Hyperlipidemia, unspecified hyperlipidemia type  E78.5 272.4   3. Class 1 obesity due to excess calories with serious comorbidity and body mass index (BMI) of 30.0 to 30.9 in adult  E66.811 278.00    E66.09 V85.30    Z68.30    4. Hypothyroidism, unspecified type  E03.9 244.9   5. Male hypogonadism  E29.1 257.2   6. Chronic nonintractable headache, unspecified headache type  R51.9 784.0    G89.29    7. Elevated hemoglobin  D58.2 282.7   8. Elevated hematocrit  R71.8 282.7   9. Need for pneumococcal vaccine  Z23 V03.82       Plan:       Hypertension, unspecified type  HYPERTENSION AND HYPERTENSIVE HEART DISEASE:  - well controlled on current regimen.   -continue with current regimen. Patient is unsure as to why he is taking losartan 50 mg (1/2 tab) BID, he would prefer to " take 100 mg daily.  (patient given option to return to once-daily dosing from current twice-daily regimen of 50 mg) and continue Spironolactone/HCTZ daily in the morning.    Hyperlipidemia, unspecified hyperlipidemia type  - labs reviewed. Noted slight increase in triglycerides 178. LDL 90.4, HDL 44.   -on atorvastatin 10 mg daily. Continue as prescribed.   - Recommend dietary modifications including increased fish intake (particularly salmon), reduced simple carbohydrates and saturated fats, increased consumption of nuts (cashews and almonds), and limiting alcohol to no more than 2 drinks per day.  - Emphasized importance of diet and exercise in managing triglyceride levels.    Class 1 obesity due to excess calories with serious comorbidity and body mass index (BMI) of 30.0 to 30.9 in adult  Work on diet modification and increase in physical activity as tolerated.     Hypothyroidism, unspecified type  -controlled. TSH 1.099. Continue levothyroxine 50 mcg daily.    Male hypogonadism  -Most recent testosterone 238.   -On Jatenzo 158 mg BID.   -[place] [facility] [place]   -Keep appointment in October.     Chronic nonintractable headache, unspecified headache type  -     butalbital-acetaminophen-caffeine -40 mg (FIORICET, ESGIC) -40 mg per tablet; Take 1 tablet by mouth every 6 (six) hours as needed for Headaches.  Dispense: 30 tablet; Refill: 1  -Well controlled, using fioricet prn. Continue to use prn.     Elevated hemoglobin  Elevated hematocrit  -Recent labs showed critical hemoglobin 20.2 with repeat 20.5. urgent referral was placed to hematology. Patient had visit this past Wednesday with a follow up visit scheduled for July. Instructed to keep appointment with Hematology.     Need for pneumococcal vaccine  -     pneumoc 20-edyta conj-dip cr(PF) (PREVNAR-20 (PF)) injection Syrg 0.5 mL      GENERAL HEALTH MAINTENANCE:  Blood work reviewed in detail with patient.   - Normal thyroid function with liver  and renal function tests within normal limits.  - Explained annual labs schedule for non-diabetic patients.  - Administered pneumococcal vaccine in office.  - Recommend exercising at least 150 minutes per week and maintaining adequate hydration.          If symptoms worsen, go to ER.  If symptoms do not improve, return to clinic.   Keep appointments with all specialists.     Patient verbalizes understanding and agrees with current treatment plan.      Follow up in about 6 months (around 12/13/2025) for blood pressure .     Patient's Medications   New Prescriptions    No medications on file   Previous Medications    ASCORBIC ACID, VITAMIN C, ORAL    Take by mouth.    ATORVASTATIN (LIPITOR) 10 MG TABLET    Take 1 tablet (10 mg total) by mouth once daily.    JATENZO 158 MG CAP    Take 316 mg by mouth 2 (two) times daily.    LEVOTHYROXINE (SYNTHROID) 50 MCG TABLET    Take 1 tablet (50 mcg total) by mouth once daily.    LOSARTAN (COZAAR) 100 MG TABLET    Take 0.5 tablets (50 mg total) by mouth 2 (two) times a day.    OMEGA-3 FATTY ACIDS 1,000 MG CAP    Take 2 g by mouth.    SPIRONOLACTONE-HYDROCHLOROTHIAZIDE 25-25MG (ALDACTAZIDE) 25-25 MG TAB    Take 1 tablet by mouth once daily.    TADALAFIL (CIALIS) 20 MG TAB    Take 20 mg by mouth daily as needed.   Modified Medications    Modified Medication Previous Medication    BUTALBITAL-ACETAMINOPHEN-CAFFEINE -40 MG (FIORICET, ESGIC) -40 MG PER TABLET butalbital-acetaminophen-caffeine -40 mg (FIORICET, ESGIC) -40 mg per tablet       Take 1 tablet by mouth every 6 (six) hours as needed for Headaches.    Take 1 tablet by mouth every 6 (six) hours as needed for Pain.   Discontinued Medications    TESTOSTERONE (ANDROGEL) 20.25 MG/1.25 GRAM (1.62 %) GLPM    20.25 mg 4 (four) times daily.       This note was generated with the assistance of ambient listening technology. Verbal consent was obtained by the patient and accompanying visitor(s) for the recording of  patient appointment to facilitate this note. I attest to having reviewed and edited the generated note for accuracy, though some syntax or spelling errors may persist. Please contact the author of this note for any clarification.         Nani Garza NP

## 2025-07-02 ENCOUNTER — DOCUMENTATION ONLY (OUTPATIENT)
Dept: HEMATOLOGY/ONCOLOGY | Facility: CLINIC | Age: 65
End: 2025-07-02
Payer: MEDICARE

## 2025-07-02 ENCOUNTER — OFFICE VISIT (OUTPATIENT)
Dept: HEMATOLOGY/ONCOLOGY | Facility: CLINIC | Age: 65
End: 2025-07-02
Payer: MEDICARE

## 2025-07-02 ENCOUNTER — TELEPHONE (OUTPATIENT)
Dept: HEMATOLOGY/ONCOLOGY | Facility: HOSPITAL | Age: 65
End: 2025-07-02
Payer: MEDICARE

## 2025-07-02 VITALS
DIASTOLIC BLOOD PRESSURE: 79 MMHG | TEMPERATURE: 98 F | OXYGEN SATURATION: 88 % | HEIGHT: 68 IN | SYSTOLIC BLOOD PRESSURE: 130 MMHG | HEART RATE: 91 BPM | WEIGHT: 205.5 LBS | BODY MASS INDEX: 31.14 KG/M2

## 2025-07-02 DIAGNOSIS — Z71.89 OTHER SPECIFIED COUNSELING: ICD-10-CM

## 2025-07-02 DIAGNOSIS — Z71.82 EXERCISE COUNSELING: ICD-10-CM

## 2025-07-02 DIAGNOSIS — Z79.890 LONG-TERM CURRENT USE OF TESTOSTERONE CYPIONATE: ICD-10-CM

## 2025-07-02 DIAGNOSIS — T38.7X5D: ICD-10-CM

## 2025-07-02 DIAGNOSIS — Z71.3 DIETARY COUNSELING: ICD-10-CM

## 2025-07-02 DIAGNOSIS — R71.8 ELEVATED HEMATOCRIT: ICD-10-CM

## 2025-07-02 DIAGNOSIS — D58.2 ELEVATED HEMOGLOBIN: Primary | ICD-10-CM

## 2025-07-02 PROCEDURE — 99213 OFFICE O/P EST LOW 20 MIN: CPT | Mod: S$PBB,,, | Performed by: INTERNAL MEDICINE

## 2025-07-02 PROCEDURE — 99213 OFFICE O/P EST LOW 20 MIN: CPT | Mod: PBBFAC,PN | Performed by: INTERNAL MEDICINE

## 2025-07-02 PROCEDURE — 99999 PR PBB SHADOW E&M-EST. PATIENT-LVL III: CPT | Mod: PBBFAC,,, | Performed by: INTERNAL MEDICINE

## 2025-07-02 NOTE — PROGRESS NOTES
PATIENT: Rashaad Amos  MRN: [id number]  DATE: 7/2/2025    Scribe Attestation: I, Alice Tan, am scribing under the direction and in the presence of Penny Richards MD. I attest that this documentation is true, accurate and complete to the best of my knowledge.    Diagnosis:   1. Elevated hemoglobin    2. Elevated hematocrit    3. Other specified counseling    4. Exercise counseling    5. Dietary counseling      Chief Complaint: Elevated Hgb and Hct    Oncologic History:      Oncologic History     Oncologic Treatment     Pathology       Subjective:    History of Present Illness: Mr. Amos is a 65 y.o. male who presents for follow up evaluation and management of elevated H&H. Patient was referred to me by his PCP Dr. Karmen Molina. Patient has never smoked. Patient is currently on testosterone treatments. He has had a history of elevated Hgb and Hct that he was told due to androgel treatments by his urologist.    Today, patient endorses weight gain of 30 lbs within the last year. He recently started walking on the treadmill. He has not been eating a healthy diet. Denies issues with eating. Denies headache. Denies any signs or symptoms of bleeding. Denies urinary issues, constipation, diarrhea, black/bloody stools, heartburn.    Patient wonders if his recently low testosterone level could be due to his lab being drawn in the late afternoon when he usually takes his testosterone supplement in the morning. He mentions that his testosterone was prescribed by his urologist Dr. Sumit Cuadra.    No abnormal lumps, bumps, or abnormal masses palpated. Pt has no fever, chills, rigors, or night sweats. Pt denies confusion or Lhermitte symptomatology. No C/T/L spine or other back pain. Denies blurry vision. No changes in urinary habits. Pt has no cough or labored breathing. Denies palpitation, chest pain with deep breathing, or leg swelling.     Past medical, surgical, family, and social histories have been reviewed and  "updated below.    Past Medical History:   Past Medical History:   Diagnosis Date    Hypertension      Past Surgical History: History reviewed. No pertinent surgical history.  Family History:   Family History   Adopted: Yes   Family history unknown: Yes     Social History:  reports that he has never smoked. He has never been exposed to tobacco smoke. He has never used smokeless tobacco. He reports current alcohol use. He reports that he does not use drugs.    Allergies:  Review of patient's allergies indicates:  No Known Allergies    Medications:  Current Outpatient Medications   Medication Sig Dispense Refill    ASCORBIC ACID, VITAMIN C, ORAL Take by mouth.      atorvastatin (LIPITOR) 10 MG tablet Take 1 tablet (10 mg total) by mouth once daily. 90 tablet 1    butalbital-acetaminophen-caffeine -40 mg (FIORICET, ESGIC) -40 mg per tablet Take 1 tablet by mouth every 6 (six) hours as needed for Headaches. 30 tablet 1    JATENZO 158 mg Cap Take 316 mg by mouth 2 (two) times daily.      levothyroxine (SYNTHROID) 50 MCG tablet Take 1 tablet (50 mcg total) by mouth once daily. 90 tablet 1    losartan (COZAAR) 100 MG tablet Take 0.5 tablets (50 mg total) by mouth 2 (two) times a day. 180 tablet 1    omega-3 fatty acids 1,000 mg Cap Take 2 g by mouth.      spironolactone-hydrochlorothiazide 25-25mg (ALDACTAZIDE) 25-25 mg Tab Take 1 tablet by mouth once daily. 90 tablet 1    tadalafiL (CIALIS) 20 MG Tab Take 20 mg by mouth daily as needed.       No current facility-administered medications for this visit.       Review of Systems  Comprehensive ROS is negative except for what was mentioned in HPI.     ECOG Performance Status:   ECOG SCORE           Objective:      Vitals:   Vitals:    07/02/25 1353   BP: 130/79   Pulse: 91   Temp: 98 °F (36.7 °C)   SpO2: (!) 88%   Weight: 93.2 kg (205 lb 7.5 oz)   Height: 5' 8" (1.727 m)       BMI: Body mass index is 31.24 kg/m².    Physical Exam  Vitals and nursing note reviewed. "   Constitutional:       General: He is not in acute distress.     Appearance: He is obese. He is not toxic-appearing or diaphoretic.      Comments: APPEARS ENERGETIC/NORMAL ENERGY   HENT:      Head: Normocephalic and atraumatic.      Comments: Vision hearing grossly intact, normal     Right Ear: External ear normal.      Left Ear: External ear normal.      Nose: Nose normal. No congestion or rhinorrhea.      Mouth/Throat:      Mouth: Mucous membranes are moist.      Pharynx: Uvula midline. No oropharyngeal exudate or posterior oropharyngeal erythema.      Comments: NO HALITOSIS  Eyes:      General: Lids are normal. Vision grossly intact.      Extraocular Movements: Extraocular movements intact.      Conjunctiva/sclera: Conjunctivae normal.      Pupils: Pupils are equal, round, and reactive to light.      Comments: No injection, hyphema, dranaige   Cardiovascular:      Rate and Rhythm: Normal rate and regular rhythm.      Pulses: Normal pulses.      Heart sounds: Normal heart sounds.      Comments: Retains pretibial hair  Pulmonary:      Effort: Pulmonary effort is normal. No respiratory distress.      Breath sounds: Normal breath sounds. No stridor. No wheezing, rhonchi or rales.      Comments: NO PLEURITIC CHEST PAIN  Chest:      Chest wall: No tenderness.   Abdominal:      General: Bowel sounds are normal. There is no distension.      Palpations: Abdomen is soft. There is no fluid wave, hepatomegaly, splenomegaly or mass.      Tenderness: There is no abdominal tenderness. There is no right CVA tenderness, left CVA tenderness, guarding or rebound.      Comments: NO ORGANOMEGALY   Musculoskeletal:         General: No swelling or tenderness. Normal range of motion.      Cervical back: Normal range of motion and neck supple. No rigidity.      Right lower leg: No edema.      Left lower leg: No edema.      Comments: NO CORDS PALPABLE, NEGATIVE HOMANS  Gait normal and stable   Lymphadenopathy:      Head:      Right side  of head: No submental, submandibular, tonsillar, preauricular, posterior auricular or occipital adenopathy.      Left side of head: No submental, submandibular, tonsillar, preauricular, posterior auricular or occipital adenopathy.      Cervical: No cervical adenopathy.      Right cervical: No superficial, deep or posterior cervical adenopathy.     Left cervical: No superficial, deep or posterior cervical adenopathy.      Upper Body:      Right upper body: No supraclavicular or axillary adenopathy.      Left upper body: No supraclavicular or axillary adenopathy.      Lower Body: No right inguinal adenopathy. No left inguinal adenopathy.   Skin:     General: Skin is warm and dry.      Coloration: Skin is not ashen, cyanotic, jaundiced, mottled or pale.      Findings: No bruising, erythema or rash.      Comments: -NO PETECHIAE  -NO OBVIOUS LESIONS BUT THOROUGH EXAM NOT CARRIED OUT   Neurological:      General: No focal deficit present.      Mental Status: He is alert and oriented to person, place, and time.      Motor: No weakness.      Gait: Gait normal.      Comments: NO CLONUS  ABSENT LHERMITTE'S   Psychiatric:         Mood and Affect: Mood normal.         Behavior: Behavior normal. Behavior is cooperative.         Judgment: Judgment normal.      Comments: Pt frustrated with his wt gain voer the last year--has begun using treadmeill since our last visit--motivated for health prevention--coping well the need to stop testosterone. Exhibits good understanding of all counseling today. Presents independently as per prev visit.     Laboratory Data:  Labs have been reviewed.    Lab Results   Component Value Date    WBC 8.86 06/09/2025    RBC 6.48 (H) 06/09/2025    HGB 20.8 (HH) 06/09/2025    HCT 59.6 (H) 06/09/2025    MCV 92 06/09/2025    MCH 32.1 (H) 06/09/2025    MCHC 34.9 06/09/2025    RDW 13.7 06/09/2025     06/09/2025    MPV 11.0 06/09/2025    GRAN 6.4 05/27/2016    GRAN 63.3 05/27/2016    LYMPH 34.8 06/09/2025     LYMPH 3.08 06/09/2025    MONO 9.8 06/09/2025    MONO 0.87 06/09/2025    EOS 4.1 06/09/2025    EOS 0.36 06/09/2025    BASO 91 01/10/2023    EOSINOPHIL 4.5 01/10/2023    BASOPHIL 1.1 06/09/2025    BASOPHIL 0.10 06/09/2025     Lab Results   Component Value Date    IRON 142 02/23/2022    TIBC 285 02/23/2022    LABIRON 50 (H) 02/23/2022      Lab Results   Component Value Date    FERRITIN 70 02/23/2022     CMP  Sodium   Date Value Ref Range Status   06/06/2025 142 136 - 145 mmol/L Final   04/08/2024 138 135 - 146 mmol/L Final   01/10/2023 139 135 - 146 mmol/L Final   01/04/2022 142 135 - 146 Final     Potassium   Date Value Ref Range Status   06/06/2025 4.0 3.5 - 5.1 mmol/L Final   04/08/2024 4.1 3.5 - 5.3 mmol/L Final   01/10/2023 4.6 3.5 - 5.3 mmol/L Final   01/04/2022 5.1 3.5 - 5.3 Final     Chloride   Date Value Ref Range Status   06/06/2025 110 95 - 110 mmol/L Final   01/10/2023 105 98 - 110 mmol/L Final   01/04/2022 106 98 - 110 Final     CO2   Date Value Ref Range Status   06/06/2025 26 23 - 29 mmol/L Final   01/10/2023 25 20 - 32 mmol/L Final   01/04/2022 27 20 - 32 Final     Carbon Dioxide   Date Value Ref Range Status   04/08/2024 23 20 - 32 mmol/L Final     Glucose   Date Value Ref Range Status   06/06/2025 103 70 - 110 mg/dL Final   04/08/2024 93 65 - 99 mg/dL Final     Comment:                  Fasting reference interval   01/10/2023 107 (H) 65 - 99 mg/dL Final     Comment:                   Fasting reference interval     For someone without known diabetes, a glucose value  between 100 and 125 mg/dL is consistent with  prediabetes and should be confirmed with a  follow-up test.          BUN   Date Value Ref Range Status   06/06/2025 23 8 - 23 mg/dL Final   01/04/2022 16 7 - 25 Final     Creatinine   Date Value Ref Range Status   06/06/2025 1.0 0.5 - 1.4 mg/dL Final   01/04/2022 0.81 0.70 - 1.25 Final     Calcium   Date Value Ref Range Status   06/06/2025 9.1 8.7 - 10.5 mg/dL Final   04/08/2024 9.5 8.6 -  10.3 mg/dL Final   01/10/2023 9.1 8.6 - 10.3 mg/dL Final   01/04/2022 10.0 8.6 - 10.3 Final     Protein Total   Date Value Ref Range Status   06/06/2025 7.3 6.0 - 8.4 gm/dL Final     Total Protein   Date Value Ref Range Status   01/10/2023 6.8 6.1 - 8.1 g/dL Final     Albumin   Date Value Ref Range Status   06/06/2025 4.0 3.5 - 5.2 g/dL Final   01/10/2023 4.3 3.6 - 5.1 g/dL Final   01/04/2022 4.7 3.6 - 5.1 Final     Albumin Level   Date Value Ref Range Status   04/08/2024 4.6 3.6 - 5.1 g/dL Final     Total Bilirubin   Date Value Ref Range Status   04/08/2024 0.5 0.2 - 1.2 mg/dL Final   01/10/2023 0.4 0.2 - 1.2 mg/dL Final     Bilirubin Total   Date Value Ref Range Status   06/06/2025 0.5 0.1 - 1.0 mg/dL Final     Comment:     For infants and newborns, interpretation of results should be based   on gestational age, weight and in agreement with clinical   observations.    Premature Infant recommended reference ranges:   0-24 hours:  <8.0 mg/dL   24-48 hours: <12.0 mg/dL   3-5 days:    <15.0 mg/dL   6-29 days:   <15.0 mg/dL     Alkaline Phosphatase   Date Value Ref Range Status   04/08/2024 71 35 - 144 U/L Final   05/27/2016 52 38 - 126 U/L Final     ALP   Date Value Ref Range Status   06/06/2025 51 40 - 150 unit/L Final     AST   Date Value Ref Range Status   06/06/2025 23 11 - 45 unit/L Final   04/08/2024 25 10 - 35 U/L Final   01/10/2023 20 10 - 35 U/L Final     ALT   Date Value Ref Range Status   06/06/2025 35 10 - 44 unit/L Final   04/08/2024 32 9 - 46 U/L Final   01/10/2023 20 9 - 46 U/L Final   01/04/2022 26 9 - 46 Final     Anion Gap   Date Value Ref Range Status   06/06/2025 6 (L) 8 - 16 mmol/L Final     eGFR   Date Value Ref Range Status   06/06/2025 >60 >60 mL/min/1.73/m2 Final     Comment:     Estimated GFR calculated using the CKD-EPI creatinine (2021) equation.   01/10/2023 93 > OR = 60 mL/min/1.73m2 Final     Comment:     The eGFR is based on the CKD-EPI 2021 equation. To calculate   the new eGFR from a  previous Creatinine or Cystatin C  result, go to https://www.kidney.org/professionals/  kdoqi/gfr%5Fcalculator       Lab Results   Component Value Date    TSH 1.099 06/06/2025     Lab Results   Component Value Date    PSA 1.33 04/11/2025     Lab Results   Component Value Date     06/11/2025     TESTOSTERONE on 10/17/2024   Ref Range & Units 7 mo ago   Testosterone, Totalm Males (Adult), [place] - [facility] 250 - 827 ng/dL 1,320 High      Testosterone on 06/11/2025  Component  Ref Range & Units (hover) 3 wk ago 2 mo ago   Testosterone Total 238 Low  1,073   Resulting Agency [facility]             EPO LEVEL on 6/11/2025      Component  Ref Range & Units (hover) 3 wk ago   Erythropoietin (EPO) 14.7   Comment:    Test performed at [facility],  [address] [place]  [address]     [phone]  Demetrice David MD, PhD - Medical Director   Resulting Agency [facility]     JAK2 Exon 12 And Other Non-V617 Mutation Detection, Bld on 06/11/2025  Component  Ref Range & Units (hover) 3 wk ago   JAK2 Sequencing Result see interpretation   Final Diagnosis SEE COMMENTS   Comment: Peripheral blood, JAK2 exons 12-15 genetic alteration  analysis:     Negative. No pathogenic genetic alterations were detected  in JAK2, exons 12-15.     A negative test result does not exclude the possibility of a  myeloproliferative neoplasm (MPN). The sensitivity of this  assay is approximately 20%, such that samples containing  lower percentages of mutated nucleic acid may not be  identified. If clinical suspicion is high for polycythemia  vera, the test JAK2 V617F Mutation Detection (Test ID:  JAK2B, JAK2M, or JAK2V) should be considered to obtain a  sensitive screen for the JAK2 V617F alteration. If clinical  suspicion is high for primary myelofibrosis (PMF) or  essential thrombocythemia (ET), consider additional testing  using the Myeloproliferative Neoplasm, JAK2 V617F with  Reflex to CALR and MPL test (Test ID: MPNR),  which  reflexively tests for the presence of a JAK2 V617F, CALR,  or MPL alteration. Clinicopathologic correlation is  recommended for a definitive diagnosis.        Pathology:   None today    Imaging:    None today     Assessment:       1. Elevated hemoglobin    2. Elevated hematocrit    3. Other specified counseling    4. Exercise counseling    5. Dietary counseling      Elevated hemoglobin/Hematocrit:  - Likely due to long-term testosterone treatments. Effect can be sustained--level was low EVEN THO PT NOTES HE DID NOT STOP THE TESTERONE AND WAS ON FULL DOSE THE DAY OF HIS LAB--AFTERNOON LAB..he THINKS MAY BE DUE TO TIME OF DAY. I'M NOT CONVINCED--HE WILL STOP IT NOW  - EPO level, Jak2 Exon 12 and other non-V617 mutation detestion (Blood), and LDH were normal. Most recent testosterone level was low despite patient continuing on oral testosterone supplements daily.   - Discussed that elevated H&H is likely due to taking testosterone supplements since recent workup was negative. Discussed increased risk of stroke with elevated H&H. Advised patient to stop testosterone supplement. Counseled on starting therapeutic phlebotomy once per week until hematocrit is below 50. Patient acknowledged understanding and agreed to plan.  - Ordered therapeutic phlebotomy today  - Ordered repeat CBC to be collected today.    ADDENDUM--HCT 58.6--SEE PHLEBOTOMY ORDERS WITH GOAL TO LESS THAN 50%--PT WILL BE CONTACTED-RATIONALE DISCUSSED DURING VISIT, PROCESS LIKEWISE. PT FAMILIAR AS GAVE BLOOD GREATER THAN 10 YEARS AGO.    Diet and Exercise Counseling:  - Counseled on avoiding concentrated sweets, preservatives, fatty/greasy/fried foods, and high carb foods. Recommended eating grass-fed meats and butter, fruits, and vegetables.  - Counseled on cardio exercise 30 min/6 days a week as an anti-cancer, anti-diabetes, anti-stroke, mood lifting, etc. May start with a few minutes per day and build up. Also counseled on weight-bearing and  muscle-strengthening exercises to maintain muscle mass. Both types of exercise are life-prolonging and improve quality of life. HE HAS BEGUN TREADMILL!  - Discussed reaching target heart rate (HR) for cardio exercise. To find your target cardio heart rate, you'll need to first estimate your maximum heart rate, which is generally calculated as 220 minus your age. Then, your target heart rate range for moderate intensity exercise is typically 50% to 70% of your maximum heart rate, and for vigorous intensity, it's 70% to 85%.   - First, find your estimated maximum HR (EMHR):  220 - your age = EMHR  - Next, multiply your estimated maximum heart rate by 50% and 70% for moderate intensity and 70% and 85% for vigorous intensity.    EMHR x 0.50 = Losest HR for moderate intensity workout  EMHR x 0.70 = Highest HR for moderate intensity workout    EMHR x 0.70 = Lowest HR for vigorous intensity workout  EMHR x 0.85 = Highest HR for vigorous intensity workout    COMORBIDITIES  Chronic HA - started in childhood  HTN - follows with PCP, previously followed with cardiology  HLD  Hypothyroidism  Prediabetes  Body mass index is 31.24 kg/m².     Plan:     Today I counseled pt about the following:  DIET AND EXERCISE, MEDICATION INSTRUCTIONS, and TODAYS PLANS: FOLLOW UP, REFERRALS, TREATMENT DETAILS   Patient exhibits understanding of all counseling today.    RTC in 4 weeks with CBC, CMP, and LDH    I spent a total of 27 minutes in a combination of focused physical exam and history, reviewing outside records, reviewing any available radiographs, counseling, communicating with other health care professionals regarding this patients medical condition, independently interpreting results, developing diagnostic and treatment plan and documenting in the EMR. Will rev lab and order phlebotomy and contact pt    Participating Clinicians:  PCP-see story board  Urology - Dr. Sumit Cuadra    Disclaimer: This note was prepared using voice  recognition system and is likely to have sound alike errors.  Please interpret accordingly.    Penny Richards M.D.  Hematology/Oncology  [facility] - [address], [address]  [place] [address]  Phone: [phone]  Fax: [phone]

## 2025-07-02 NOTE — TELEPHONE ENCOUNTER
lEFT MESSAGE THAT HCT STILL VERY HIGH AND NEEDS PHLEBOTOMY AS DISCUSSED--MADE AWARE ORDERS ENTERED FOR WEEKLY REMOVAL, AGAIN AS DISCUSSED TODAY IN PERSON, AND HAS F/UP 4 WEEKS

## 2025-07-04 ENCOUNTER — PATIENT MESSAGE (OUTPATIENT)
Dept: HEMATOLOGY/ONCOLOGY | Facility: CLINIC | Age: 65
End: 2025-07-04
Payer: MEDICARE

## 2025-07-15 ENCOUNTER — PATIENT MESSAGE (OUTPATIENT)
Dept: HEMATOLOGY/ONCOLOGY | Facility: CLINIC | Age: 65
End: 2025-07-15
Payer: MEDICARE

## 2025-07-24 ENCOUNTER — PATIENT MESSAGE (OUTPATIENT)
Dept: HEMATOLOGY/ONCOLOGY | Facility: CLINIC | Age: 65
End: 2025-07-24
Payer: MEDICARE

## 2025-07-24 DIAGNOSIS — R71.8 ELEVATED HEMATOCRIT: ICD-10-CM

## 2025-07-24 DIAGNOSIS — D58.2 ELEVATED HEMOGLOBIN: Primary | ICD-10-CM

## 2025-08-05 NOTE — PROGRESS NOTES
PATIENT: Rashaad Amos  MRN: [id number]  DATE: 8/6/2025    Scribe Attestation: I, Caesar Armstrong, am scribing under the direction and in the presence of Penny Richards MD. I attest that this documentation is true, accurate and complete to the best of my knowledge.      Diagnosis:   1. Eosinophilia, unspecified type    2. Deficiency of other specified B group vitamins        Chief Complaint: Eosinophilia    Oncologic History:      Oncologic History     Oncologic Treatment     Pathology       Subjective:    History of Present Illness: Mr. Amos is a 65 y.o. male who presents for follow up evaluation and management of elevated H&H. Patient was referred to me by his PCP Dr. Karmen Molina. Patient has never smoked. Patient is currently on testosterone treatments. He has had a history of elevated Hgb and Hct that he was told due to androgel treatments by his urologist.    Today, patient reports endorses lower back pain. He notes that it has not improved since being diagnosed with arthritis in his back 20 years ago.     Patient takes vitamin E, C and multivitamins daily.     Pt denies any history of chronic illnesses, fever infections, or lupus. Denies any history of hyperthyroidism. Denies any knee surgery or trauma-REL DEC LEFT DTR--SEE EXAM. Denies a history of severe allergies, stating that he only suffers from seasonal allergies OCC-not severe. Denies any dietary allergies. He takes vit e,c multi vit--no other supplements--asked to r/o secondary eosinophilia    Pt denies abnormal lumps, bumps, or abnormal masses palpated. Pt has no fever, chills, rigors, runny nose or night sweats. Pt denies confusion or Lhermitte symptomatology. No C/T/L spine or other back pain. Denies blurry vision. No changes in urinary habits. Pt has no cough or labored breathing. Denies palpitation, chest pain with deep breathing, or leg swelling. Denies having diarrhea. Denies epistaxis, gum bleeding, hematuria, hematochezia, and black/tarry  stools. No rash, labored breathing, rhinorrhea, itchy eys etc. [facility]    Past medical, surgical, family, and social histories have been reviewed and updated below.    Past Medical History:   Past Medical History:   Diagnosis Date    Hypertension      Past Surgical History: History reviewed. No pertinent surgical history.  Family History:   Family History   Adopted: Yes   Family history unknown: Yes     Social History:  reports that he has never smoked. He has never been exposed to tobacco smoke. He has never used smokeless tobacco. He reports current alcohol use. He reports that he does not use drugs.    Allergies:  Review of patient's allergies indicates:  No Known Allergies    Medications:  Current Outpatient Medications   Medication Sig Dispense Refill    ASCORBIC ACID, VITAMIN C, ORAL Take by mouth.      atorvastatin (LIPITOR) 10 MG tablet Take 1 tablet (10 mg total) by mouth once daily. 90 tablet 1    butalbital-acetaminophen-caffeine -40 mg (FIORICET, ESGIC) -40 mg per tablet Take 1 tablet by mouth every 6 (six) hours as needed for Headaches. 30 tablet 1    levothyroxine (SYNTHROID) 50 MCG tablet Take 1 tablet (50 mcg total) by mouth once daily. 90 tablet 1    losartan (COZAAR) 100 MG tablet Take 0.5 tablets (50 mg total) by mouth 2 (two) times a day. 180 tablet 1    omega-3 fatty acids 1,000 mg Cap Take 2 g by mouth.      spironolactone-hydrochlorothiazide 25-25mg (ALDACTAZIDE) 25-25 mg Tab Take 1 tablet by mouth once daily. 90 tablet 1    tadalafiL (CIALIS) 20 MG Tab Take 20 mg by mouth daily as needed.      JATENZO 158 mg Cap Take 316 mg by mouth 2 (two) times daily.       No current facility-administered medications for this visit.       Review of Systems   Constitutional:  Negative for appetite change and unexpected weight change.   HENT:  Negative for mouth sores.    Eyes:  Negative for visual disturbance.   Respiratory:  Negative for cough and shortness of breath.     Cardiovascular:  Negative for chest pain.   Gastrointestinal:  Negative for abdominal pain and diarrhea.   Genitourinary:  Negative for frequency.   Musculoskeletal:  Negative for back pain.   Skin:  Negative for rash.   Neurological:  Negative for headaches.   Hematological:  Negative for adenopathy.   Psychiatric/Behavioral:  The patient is not nervous/anxious.      Comprehensive ROS is negative except for what was mentioned in HPI.     ECOG Performance Status:   ECOG SCORE           Objective:      Vitals:   Vitals:    08/06/25 1438   BP: 106/78   Pulse: 81   Temp: (!) 95.9 °F (35.5 °C)   SpO2: (!) 90%   Weight: 92.6 kg (204 lb 2.3 oz)         BMI: Body mass index is 31.04 kg/m².    Physical Exam  Vitals and nursing note reviewed.   Constitutional:       General: He is not in acute distress.     Appearance: He is obese. He is not toxic-appearing or diaphoretic.      Comments: APPEARS ENERGETIC/NORMAL ENERGY   HENT:      Head: Normocephalic and atraumatic.      Comments: Vision hearing grossly intact, normal     Right Ear: External ear normal.      Left Ear: External ear normal.      Nose: Nose normal. No congestion or rhinorrhea.      Mouth/Throat:      Mouth: Mucous membranes are moist.      Pharynx: Uvula midline. No oropharyngeal exudate or posterior oropharyngeal erythema.      Comments: NO HALITOSIS  Eyes:      General: Lids are normal. Vision grossly intact.      Extraocular Movements: Extraocular movements intact.      Conjunctiva/sclera: Conjunctivae normal.      Pupils: Pupils are equal, round, and reactive to light.      Comments: No injection, hyphema, dranaige   Cardiovascular:      Rate and Rhythm: Normal rate and regular rhythm.      Pulses: Normal pulses.      Heart sounds: Normal heart sounds.      Comments: Retains pretibial hair  Pulmonary:      Effort: Pulmonary effort is normal. No respiratory distress.      Breath sounds: Normal breath sounds. No stridor. No wheezing, rhonchi or rales.       Comments: NO PLEURITIC CHEST PAIN  Chest:      Chest wall: No tenderness.   Abdominal:      General: Bowel sounds are normal. There is no distension.      Palpations: Abdomen is soft. There is no fluid wave, hepatomegaly, splenomegaly or mass.      Tenderness: There is no abdominal tenderness. There is no right CVA tenderness, left CVA tenderness, guarding or rebound.      Comments: NO ORGANOMEGALY   Musculoskeletal:         General: No swelling or tenderness. Normal range of motion.      Cervical back: Normal range of motion and neck supple. No rigidity.      Right lower leg: No edema.      Left lower leg: No edema.      Comments: NO CORDS PALPABLE, NEGATIVE HOMANS  Gait normal and stable   Lymphadenopathy:      Head:      Right side of head: No submental, submandibular, tonsillar, preauricular, posterior auricular or occipital adenopathy.      Left side of head: No submental, submandibular, tonsillar, preauricular, posterior auricular or occipital adenopathy.      Cervical: No cervical adenopathy.      Right cervical: No superficial, deep or posterior cervical adenopathy.     Left cervical: No superficial, deep or posterior cervical adenopathy.      Upper Body:      Right upper body: No supraclavicular or axillary adenopathy.      Left upper body: No supraclavicular or axillary adenopathy.      Lower Body: No right inguinal adenopathy. No left inguinal adenopathy.   Skin:     General: Skin is warm and dry.      Coloration: Skin is not ashen, cyanotic, jaundiced, mottled or pale.      Findings: No bruising, erythema or rash.      Comments: -NO PETECHIAE  -NO OBVIOUS LESIONS BUT THOROUGH EXAM NOT CARRIED OUT   Neurological:      General: No focal deficit present.      Mental Status: He is alert and oriented to person, place, and time.      Motor: No weakness.      Gait: Gait normal.      Deep Tendon Reflexes:      Reflex Scores:       Patellar reflexes are 2+ on the right side and 1+ on the left side.     Comments:  "NO CLONUS  ABSENT LHERMITTE'S   Psychiatric:         Mood and Affect: Mood normal.         Behavior: Behavior normal. Behavior is cooperative.         Judgment: Judgment normal.      Comments: Pt frustrated with his wt gain voer the last year--has begun using treadmeill --motivated for health prevention--coping well the need to stop testosterone-notes he feels"great" today--may be from exercise. Exhibits good understanding of all counseling today. Presents independently as per prev visit.     Laboratory Data:  Labs have been reviewed.    Lab Results   Component Value Date    WBC 8.74 08/01/2025    RBC 4.76 08/01/2025    HGB 15.4 08/01/2025    HCT 43.5 08/01/2025    MCV 91 08/01/2025    MCH 32.4 (H) 08/01/2025    MCHC 35.4 08/01/2025    RDW 12.8 08/01/2025     08/01/2025    MPV 10.8 08/01/2025    GRAN 6.4 05/27/2016    GRAN 63.3 05/27/2016    LYMPH 31.7 08/01/2025    LYMPH 2.77 08/01/2025    MONO 7.1 08/01/2025    MONO 0.62 08/01/2025    EOS 13.6 (H) 08/01/2025    EOS 1.19 (H) 08/01/2025    BASO 91 01/10/2023    EOSINOPHIL 4.5 01/10/2023    BASOPHIL 0.8 08/01/2025    BASOPHIL 0.07 08/01/2025     Lab Results   Component Value Date    IRON 142 02/23/2022    TIBC 285 02/23/2022    LABIRON 50 (H) 02/23/2022      Lab Results   Component Value Date    FERRITIN 70 02/23/2022     CMP  Sodium   Date Value Ref Range Status   08/01/2025 140 136 - 145 mmol/L Final   04/08/2024 138 135 - 146 mmol/L Final   01/10/2023 139 135 - 146 mmol/L Final   01/04/2022 142 135 - 146 Final     Potassium   Date Value Ref Range Status   08/01/2025 4.0 3.5 - 5.1 mmol/L Final   04/08/2024 4.1 3.5 - 5.3 mmol/L Final   01/10/2023 4.6 3.5 - 5.3 mmol/L Final   01/04/2022 5.1 3.5 - 5.3 Final     Chloride   Date Value Ref Range Status   08/01/2025 107 95 - 110 mmol/L Final   01/10/2023 105 98 - 110 mmol/L Final   01/04/2022 106 98 - 110 Final     CO2   Date Value Ref Range Status   08/01/2025 25 23 - 29 mmol/L Final   01/10/2023 25 20 - 32 mmol/L " Final   01/04/2022 27 20 - 32 Final     Carbon Dioxide   Date Value Ref Range Status   04/08/2024 23 20 - 32 mmol/L Final     Glucose   Date Value Ref Range Status   08/01/2025 132 (H) 70 - 110 mg/dL Final   04/08/2024 93 65 - 99 mg/dL Final     Comment:                  Fasting reference interval   01/10/2023 107 (H) 65 - 99 mg/dL Final     Comment:                   Fasting reference interval     For someone without known diabetes, a glucose value  between 100 and 125 mg/dL is consistent with  prediabetes and should be confirmed with a  follow-up test.          BUN   Date Value Ref Range Status   08/01/2025 21 8 - 23 mg/dL Final   01/04/2022 16 7 - 25 Final     Creatinine   Date Value Ref Range Status   08/01/2025 0.8 0.5 - 1.4 mg/dL Final   01/04/2022 0.81 0.70 - 1.25 Final     Calcium   Date Value Ref Range Status   08/01/2025 8.6 (L) 8.7 - 10.5 mg/dL Final   04/08/2024 9.5 8.6 - 10.3 mg/dL Final   01/10/2023 9.1 8.6 - 10.3 mg/dL Final   01/04/2022 10.0 8.6 - 10.3 Final     Protein Total   Date Value Ref Range Status   08/01/2025 6.9 6.0 - 8.4 gm/dL Final     Total Protein   Date Value Ref Range Status   01/10/2023 6.8 6.1 - 8.1 g/dL Final     Albumin   Date Value Ref Range Status   08/01/2025 4.2 3.5 - 5.2 g/dL Final   01/10/2023 4.3 3.6 - 5.1 g/dL Final   01/04/2022 4.7 3.6 - 5.1 Final     Albumin Level   Date Value Ref Range Status   04/08/2024 4.6 3.6 - 5.1 g/dL Final     Total Bilirubin   Date Value Ref Range Status   04/08/2024 0.5 0.2 - 1.2 mg/dL Final   01/10/2023 0.4 0.2 - 1.2 mg/dL Final     Bilirubin Total   Date Value Ref Range Status   08/01/2025 0.4 0.1 - 1.0 mg/dL Final     Comment:     For infants and newborns, interpretation of results should be based   on gestational age, weight and in agreement with clinical   observations.    Premature Infant recommended reference ranges:   0-24 hours:  <8.0 mg/dL   24-48 hours: <12.0 mg/dL   3-5 days:    <15.0 mg/dL   6-29 days:   <15.0 mg/dL     Alkaline  Phosphatase   Date Value Ref Range Status   04/08/2024 71 35 - 144 U/L Final   05/27/2016 52 38 - 126 U/L Final     ALP   Date Value Ref Range Status   08/01/2025 73 40 - 150 unit/L Final     AST   Date Value Ref Range Status   08/01/2025 31 11 - 45 unit/L Final   04/08/2024 25 10 - 35 U/L Final   01/10/2023 20 10 - 35 U/L Final     ALT   Date Value Ref Range Status   08/01/2025 43 10 - 44 unit/L Final   04/08/2024 32 9 - 46 U/L Final   01/10/2023 20 9 - 46 U/L Final   01/04/2022 26 9 - 46 Final     Anion Gap   Date Value Ref Range Status   08/01/2025 8 8 - 16 mmol/L Final     eGFR   Date Value Ref Range Status   08/01/2025 >60 >60 mL/min/1.73/m2 Final     Comment:     Estimated GFR calculated using the CKD-EPI creatinine (2021) equation.   01/10/2023 93 > OR = 60 mL/min/1.73m2 Final     Comment:     The eGFR is based on the CKD-EPI 2021 equation. To calculate   the new eGFR from a previous Creatinine or Cystatin C  result, go to https://www.kidney.org/professionals/  kdoqi/gfr%5Fcalculator       Lab Results   Component Value Date    TSH 1.099 06/06/2025     Lab Results   Component Value Date    PSA 1.33 04/11/2025     Lab Results   Component Value Date     08/01/2025     06/11/2025     TESTOSTERONE on 10/17/2024   Ref Range & Units 7 mo ago   Testosterone, Totalm Males (Adult), [place] - [facility] 250 - 827 ng/dL 1,320 High      Testosterone on 06/11/2025  Component  Ref Range & Units (hover) 3 wk ago 2 mo ago   Testosterone Total 238 Low  1,073   Resulting Agency [facility]             EPO LEVEL on 6/11/2025      Component  Ref Range & Units (hover) 3 wk ago   Erythropoietin (EPO) 14.7   Comment:    Test performed at [facility],  [address], [place] [id number] [phone]  Demetrice David MD, PhD - Medical Director   Resulting Agency [facility]     JAK2 Exon 12 And Other Non-V617 Mutation Detection, Bld on 06/11/2025  Component  Ref Range & Units (hover) 3 wk ago   JAK2 Sequencing  Result see interpretation   Final Diagnosis SEE COMMENTS   Comment: Peripheral blood, JAK2 exons 12-15 genetic alteration  analysis:     Negative. No pathogenic genetic alterations were detected  in JAK2, exons 12-15.     A negative test result does not exclude the possibility of a  myeloproliferative neoplasm (MPN). The sensitivity of this  assay is approximately 20%, such that samples containing  lower percentages of mutated nucleic acid may not be  identified. If clinical suspicion is high for polycythemia  vera, the test JAK2 V617F Mutation Detection (Test ID:  JAK2B, JAK2M, or JAK2V) should be considered to obtain a  sensitive screen for the JAK2 V617F alteration. If clinical  suspicion is high for primary myelofibrosis (PMF) or  essential thrombocythemia (ET), consider additional testing  using the Myeloproliferative Neoplasm, JAK2 V617F with  Reflex to CALR and MPL test (Test ID: MPNR), which  reflexively tests for the presence of a JAK2 V617F, CALR,  or MPL alteration. Clinicopathologic correlation is  recommended for a definitive diagnosis.        Pathology:   None today    Imaging:   None today     Assessment:       1. Eosinophilia, unspecified type    2. Deficiency of other specified B group vitamins      Elevated hemoglobin/Hematocrit: resolved off testosterone    EOSINOPHILIA PERSISTS:  - Patient EOS is elevated at 13.6% SUSTAINED. Currently reviewing patient medicines to determine rather they cause Eosinophilia. NONE DO  --REVIEWED NUMEROUS CAUSES--NOTHING OBVIOUS. REV TESTING: EOS HAS BEEN OVER 1500 ABSOLUTE CURRENTLY ABOUT 1200--STABLY OVER 500 FOR SEVERAL ASSESSMENT OVER RECNT MO AND WAS SLIGHTLY ELEVATED ABOUT 2022  -NO H/O FOOD ALLERGIES OR MED ALLERGIES  - Ordered C-Reactive Protein, JAK2 Exon 12, Peripheral Smear For Hemolysis or Low Platelets, Sedimentation Rate, Tryptase, Vitamin B12 and Folate today.     Diet and Exercise Counseling: PT IS USING TREADMILL--GOOD ENERGY AS A RESULT  - Counseled  on avoiding concentrated sweets, preservatives, fatty/greasy/fried foods, and high carb foods. Recommended eating grass-fed meats and butter, fruits, and vegetables.  - Counseled on cardio exercise 30 min/6 days a week as an anti-cancer, anti-diabetes, anti-stroke, mood lifting, etc. May start with a few minutes per day and build up. Also counseled on weight-bearing and muscle-strengthening exercises to maintain muscle mass. Both types of exercise are life-prolonging and improve quality of life.   - Discussed reaching target heart rate (HR) for cardio exercise. To find your target cardio heart rate, you'll need to first estimate your maximum heart rate, which is generally calculated as 220 minus your age. Then, your target heart rate range for moderate intensity exercise is typically 50% to 70% of your maximum heart rate, and for vigorous intensity, it's 70% to 85%.   - First, find your estimated maximum HR (EMHR):  220 - your age = EMHR  - Next, multiply your estimated maximum heart rate by 50% and 70% for moderate intensity and 70% and 85% for vigorous intensity.    EMHR x 0.50 = Losest HR for moderate intensity workout  EMHR x 0.70 = Highest HR for moderate intensity workout    EMHR x 0.70 = Lowest HR for vigorous intensity workout  EMHR x 0.85 = Highest HR for vigorous intensity workout    COMORBIDITIES  Chronic HA - started in childhood-NO C/O TODAY  HTN - follows with PCP, previously followed with cardiology-bp GOOD TODAY  HLD  Hypothyroidism- NO H/O THYROIDITIS   Prediabetes  Plan:     Today I counseled pt about the following:  TODAYS PLANS: FOLLOW UP, REFERRALS, TREATMENT DETAILS and DIET AND EXERCISE   Patient exhibits understanding of all counseling today.    RTC in 1 month to review lab results    I spent a total of 61 minutes in a combination of focused physical exam and history, reviewing outside records, reviewing any available radiographs, counseling, communicating with other health care professionals  regarding this patients medical condition, independently interpreting results, developing diagnostic and treatment plan and documenting in the EMR. Reviewed patient medicines to see rather they cause Eosinophilia. RESEARCH AS MYLES ABOVE--REV DX POSSIBILITES W PT, SOME RATHER DANGEROUS, W/UP, ORDERS ETC    Participating Clinicians:  PCP-see story board  Urology - Dr. Sumit Cuadra    Disclaimer: This note was prepared using voice recognition system and is likely to have sound alike errors.  Please interpret accordingly.    Penny Richards M.D.  Hematology/Oncology  [facility] - [address], [address]  [place] [address]  Phone: [phone]  Fax: [phone]

## 2025-08-06 ENCOUNTER — OFFICE VISIT (OUTPATIENT)
Dept: HEMATOLOGY/ONCOLOGY | Facility: CLINIC | Age: 65
End: 2025-08-06
Payer: MEDICARE

## 2025-08-06 VITALS
HEART RATE: 81 BPM | WEIGHT: 204.13 LBS | OXYGEN SATURATION: 90 % | DIASTOLIC BLOOD PRESSURE: 78 MMHG | BODY MASS INDEX: 31.04 KG/M2 | SYSTOLIC BLOOD PRESSURE: 106 MMHG | TEMPERATURE: 96 F

## 2025-08-06 DIAGNOSIS — E53.8 DEFICIENCY OF OTHER SPECIFIED B GROUP VITAMINS: ICD-10-CM

## 2025-08-06 DIAGNOSIS — Z71.82 EXERCISE COUNSELING: ICD-10-CM

## 2025-08-06 DIAGNOSIS — D75.1 SECONDARY ERYTHROCYTOSIS: ICD-10-CM

## 2025-08-06 DIAGNOSIS — Z71.3 DIETARY COUNSELING: ICD-10-CM

## 2025-08-06 DIAGNOSIS — T38.7X5D: ICD-10-CM

## 2025-08-06 DIAGNOSIS — D72.10 EOSINOPHILIA, UNSPECIFIED TYPE: Primary | ICD-10-CM

## 2025-08-06 DIAGNOSIS — Z71.89 OTHER SPECIFIED COUNSELING: ICD-10-CM

## 2025-08-06 PROCEDURE — 99213 OFFICE O/P EST LOW 20 MIN: CPT | Mod: PBBFAC,PN | Performed by: INTERNAL MEDICINE

## 2025-08-06 PROCEDURE — 99999 PR PBB SHADOW E&M-EST. PATIENT-LVL III: CPT | Mod: PBBFAC,,, | Performed by: INTERNAL MEDICINE

## 2025-08-06 PROCEDURE — 99215 OFFICE O/P EST HI 40 MIN: CPT | Mod: S$PBB,,, | Performed by: INTERNAL MEDICINE

## 2025-09-03 ENCOUNTER — OFFICE VISIT (OUTPATIENT)
Dept: HEMATOLOGY/ONCOLOGY | Facility: CLINIC | Age: 65
End: 2025-09-03
Payer: MEDICARE

## 2025-09-03 VITALS
OXYGEN SATURATION: 93 % | SYSTOLIC BLOOD PRESSURE: 113 MMHG | TEMPERATURE: 97 F | DIASTOLIC BLOOD PRESSURE: 74 MMHG | WEIGHT: 204.13 LBS | BODY MASS INDEX: 31.04 KG/M2 | HEART RATE: 73 BPM

## 2025-09-03 DIAGNOSIS — Z71.82 EXERCISE COUNSELING: ICD-10-CM

## 2025-09-03 DIAGNOSIS — D72.10 EOSINOPHILIA, UNSPECIFIED TYPE: Primary | ICD-10-CM

## 2025-09-03 DIAGNOSIS — Z71.89 OTHER SPECIFIED COUNSELING: ICD-10-CM

## 2025-09-03 DIAGNOSIS — T38.7X5D: ICD-10-CM

## 2025-09-03 PROCEDURE — 99214 OFFICE O/P EST MOD 30 MIN: CPT | Mod: S$PBB,,, | Performed by: INTERNAL MEDICINE

## 2025-09-03 PROCEDURE — 99999 PR PBB SHADOW E&M-EST. PATIENT-LVL IV: CPT | Mod: PBBFAC,,, | Performed by: INTERNAL MEDICINE

## 2025-09-03 PROCEDURE — 99214 OFFICE O/P EST MOD 30 MIN: CPT | Mod: PBBFAC,PN | Performed by: INTERNAL MEDICINE
